# Patient Record
Sex: FEMALE | Race: AMERICAN INDIAN OR ALASKA NATIVE
[De-identification: names, ages, dates, MRNs, and addresses within clinical notes are randomized per-mention and may not be internally consistent; named-entity substitution may affect disease eponyms.]

---

## 2019-07-17 ENCOUNTER — HOSPITAL ENCOUNTER (INPATIENT)
Dept: HOSPITAL 5 - ED | Age: 59
LOS: 18 days | DRG: 208 | End: 2019-08-04
Attending: INTERNAL MEDICINE | Admitting: INTERNAL MEDICINE
Payer: MEDICAID

## 2019-07-17 DIAGNOSIS — C79.51: ICD-10-CM

## 2019-07-17 DIAGNOSIS — M84.48XA: ICD-10-CM

## 2019-07-17 DIAGNOSIS — E87.1: ICD-10-CM

## 2019-07-17 DIAGNOSIS — R00.0: ICD-10-CM

## 2019-07-17 DIAGNOSIS — C50.919: ICD-10-CM

## 2019-07-17 DIAGNOSIS — J96.21: Primary | ICD-10-CM

## 2019-07-17 DIAGNOSIS — R65.10: ICD-10-CM

## 2019-07-17 DIAGNOSIS — G82.20: ICD-10-CM

## 2019-07-17 DIAGNOSIS — G93.41: ICD-10-CM

## 2019-07-17 DIAGNOSIS — Z86.718: ICD-10-CM

## 2019-07-17 DIAGNOSIS — I10: ICD-10-CM

## 2019-07-17 DIAGNOSIS — M84.419A: ICD-10-CM

## 2019-07-17 DIAGNOSIS — D63.8: ICD-10-CM

## 2019-07-17 LAB
ALBUMIN SERPL-MCNC: 3.2 G/DL (ref 3.9–5)
ALT SERPL-CCNC: 23 UNITS/L (ref 7–56)
ANISOCYTOSIS BLD QL SMEAR: (no result)
APTT BLD: 27.2 SEC. (ref 24.2–36.6)
BAND NEUTROPHILS # (MANUAL): 0 K/MM3
BUN SERPL-MCNC: 11 MG/DL (ref 7–17)
BUN/CREAT SERPL: 37 %
CALCIUM SERPL-MCNC: 9.5 MG/DL (ref 8.4–10.2)
HCT VFR BLD CALC: 30.1 % (ref 30.3–42.9)
HEMOLYSIS INDEX: 36
HGB BLD-MCNC: 10.1 GM/DL (ref 10.1–14.3)
INR PPP: 1.22 (ref 0.87–1.13)
MCHC RBC AUTO-ENTMCNC: 34 % (ref 30–34)
MCV RBC AUTO: 95 FL (ref 79–97)
MYELOCYTES # (MANUAL): 0 K/MM3
PLATELET # BLD: 123 K/MM3 (ref 140–440)
PROMYELOCYTES # (MANUAL): 0 K/MM3
RBC # BLD AUTO: 3.18 M/MM3 (ref 3.65–5.03)
TARGETS BLD QL SMEAR: (no result)
TOTAL CELLS COUNTED BLD: 100

## 2019-07-17 PROCEDURE — 36415 COLL VENOUS BLD VENIPUNCTURE: CPT

## 2019-07-17 PROCEDURE — 82553 CREATINE MB FRACTION: CPT

## 2019-07-17 PROCEDURE — 80053 COMPREHEN METABOLIC PANEL: CPT

## 2019-07-17 PROCEDURE — 96365 THER/PROPH/DIAG IV INF INIT: CPT

## 2019-07-17 PROCEDURE — 85025 COMPLETE CBC W/AUTO DIFF WBC: CPT

## 2019-07-17 PROCEDURE — 83880 ASSAY OF NATRIURETIC PEPTIDE: CPT

## 2019-07-17 PROCEDURE — 94002 VENT MGMT INPAT INIT DAY: CPT

## 2019-07-17 PROCEDURE — 80048 BASIC METABOLIC PNL TOTAL CA: CPT

## 2019-07-17 PROCEDURE — 36600 WITHDRAWAL OF ARTERIAL BLOOD: CPT

## 2019-07-17 PROCEDURE — 99285 EMERGENCY DEPT VISIT HI MDM: CPT

## 2019-07-17 PROCEDURE — 92950 HEART/LUNG RESUSCITATION CPR: CPT

## 2019-07-17 PROCEDURE — 85007 BL SMEAR W/DIFF WBC COUNT: CPT

## 2019-07-17 PROCEDURE — 83735 ASSAY OF MAGNESIUM: CPT

## 2019-07-17 PROCEDURE — 82803 BLOOD GASES ANY COMBINATION: CPT

## 2019-07-17 PROCEDURE — 85379 FIBRIN DEGRADATION QUANT: CPT

## 2019-07-17 PROCEDURE — 82962 GLUCOSE BLOOD TEST: CPT

## 2019-07-17 PROCEDURE — 87116 MYCOBACTERIA CULTURE: CPT

## 2019-07-17 PROCEDURE — 85730 THROMBOPLASTIN TIME PARTIAL: CPT

## 2019-07-17 PROCEDURE — 94760 N-INVAS EAR/PLS OXIMETRY 1: CPT

## 2019-07-17 PROCEDURE — 71045 X-RAY EXAM CHEST 1 VIEW: CPT

## 2019-07-17 PROCEDURE — 71275 CT ANGIOGRAPHY CHEST: CPT

## 2019-07-17 PROCEDURE — 93010 ELECTROCARDIOGRAM REPORT: CPT

## 2019-07-17 PROCEDURE — 85610 PROTHROMBIN TIME: CPT

## 2019-07-17 PROCEDURE — 4A033R1 MEASUREMENT OF ARTERIAL SATURATION, PERIPHERAL, PERCUTANEOUS APPROACH: ICD-10-PCS | Performed by: INTERNAL MEDICINE

## 2019-07-17 PROCEDURE — 94660 CPAP INITIATION&MGMT: CPT

## 2019-07-17 PROCEDURE — 93005 ELECTROCARDIOGRAM TRACING: CPT

## 2019-07-17 PROCEDURE — 82550 ASSAY OF CK (CPK): CPT

## 2019-07-17 PROCEDURE — 5A09357 ASSISTANCE WITH RESPIRATORY VENTILATION, LESS THAN 24 CONSECUTIVE HOURS, CONTINUOUS POSITIVE AIRWAY PRESSURE: ICD-10-PCS | Performed by: INTERNAL MEDICINE

## 2019-07-17 PROCEDURE — 84484 ASSAY OF TROPONIN QUANT: CPT

## 2019-07-17 NOTE — XRAY REPORT
CHEST 1 VIEW 5:34 PM



INDICATION / CLINICAL INFORMATION:

Shortness of breath.



COMPARISON: 

None available.



FINDINGS:



SUPPORT DEVICES: None.

HEART / MEDIASTINUM: There is mild cardiomegaly with a left ventricular configuration. Pulmonary vasc
ulature is borderline.

LUNGS / PLEURA: There is dense consolidation in the left lower lobe with obscuration of the hemidiaph
ragm. There may be a small left pleural effusion. No pneumothorax. 

ADDITIONAL FINDINGS: There are bilateral rib fractures which are of uncertain age. There is moderate 
thoracolumbar scoliosis. There is mild mottled lucency in the distal right clavicle with a possible f
racture present. There are also may be a lytic lesion in the distal left clavicle.



IMPRESSION:

1. Left lower lobe pneumonia. 

2. Bilateral rib fractures. Possible fracture of the distal right clavicle could be pathologic. Possi
ble lytic lesion in the distal left clavicle.



Signer Name: Narciso Covington MD 

Signed: 7/17/2019 5:53 PM

 Workstation Name: [id number]

## 2019-07-17 NOTE — HISTORY AND PHYSICAL REPORT
History of Present Illness


Date of examination: 07/17/19


History of present illness: 


59-year-old with a history of hypertension, metastatic breast cancer, because 

emergency room with complaints of chest pain located across the chest that 

started 3 months ago.  mariela was diagnosed with breast cancer in 2014, status 

post lumpectomy, but over the last 3 months her cancer has metastasized, since 

then she has been having shortness of breath, started on home oxygen and chest 

pain secondary to metastases to the ribs and other bones. Also complaining of 

pain into the shoulders which are sharp, intermittent every 5 minutes, intensity

7/10, no radiation, relieved with Percocet at home.  The patient also 

complaining of shortness of breath 2 months, she was placed on home oxygen.  In

the ER patient was placed on BiPAP


She was admitted at [facility] July 6, she had presenting symptoms are 

shortness breath, chest pain.  I have requested and reviewed the records.  She 

had a V/Q which was negative for PE, Doppler was negative for DVT.  Is also 

noted to have spinal cord involvement of the tumor, also had hydronephrosis 

bilaterally with hydroureteral.  The patient was started on palliative radiation

treatment 4 days ago.  She was discharged to hospice from [facility], 

unclear if this was started


Review of systems


Constitutional: no  weight loss, chills, fever


Ears, eyes, nose, mouth and throat: no nasal congestion, no nasal discharge, no 

sinus pressure, no vision change, no red eye.


Neck: No neck pain or rigidity.


Cardiovascular: no palpitations, chest pain


Respiratory: no cough, shortness of breath


Gastrointestinal: no hematochezia, abdominal pain


Genitourinary : no  frequency , no hematuria


Musculoskeletal: no joint swelling or muscle ache 


Integumentary: no rash, no pruritis


Neurological: no parathesias, no focal weakness


Endocrine: no cold or heat intolerance, no polyuria or polydipsia


Hematologic/Lymphatic: no easy bruising, no easy bleeding, no gland swelling


Allergic/Immunologic: no urticaria, no angioedema.





PAST MEDICAL HISTORY:hypertension, metastatic breast cancer





PAST SURGICAL HISTORY: Lumpectomy





SOCIAL HISTORY: Denies alcohol,  drugs, tobacco





FAMILY HISTORY: Hypertension








Medications and Allergies


                                    Allergies











Allergy/AdvReac Type Severity Reaction Status Date / Time


 


No Known Allergies Allergy   Unverified 12/19/13 15:54











                                Home Medications











 Medication  Instructions  Recorded  Confirmed  Last Taken  Type


 


No Known Home Medications [No  07/22/19 07/22/19 Unknown History





Reported Home Medications]     











Active Meds: 


Active Medications





Ketorolac Tromethamine (Toradol)  15 mg IV ONCE ONE


   Stop: 07/17/19 22:16











Exam





- Physical Exam


Narrative exam: 


General Apperance: The patient lying in bed,  breathing comfortable 


HEENT: Normocephalic, atraumatic.  Pupils equally round and reactive to light, 

EOMI, no sclericterus or JVD or thyromegaly or nodule.  , no carotid bruit, 

mucous membranes moist, no exudate or erythema


Heart: S1-S2, regular is rhythm


Lungs: Clear to auscultation bilaterally, breathing comfortable


Abdomen: Positive bowel sounds, soft, nontender, nondistended, no organomegaly


Extremities: No edema cyanosis clubbing


Skin: no rash, nodule, warm and dry


Neuro: cranial nerves 2-12 intact, speech is fluent, moves all four extremities





- Constitutional


Vitals: 


                                        











Temp Pulse Resp BP Pulse Ox


 


 98.4 F   133 H  16   116/78   100 


 


 07/17/19 19:22  07/17/19 20:30  07/17/19 20:30  07/17/19 20:30  07/17/19 20:30














Results





- Labs


CBC & Chem 7: 


                                 07/24/19 04:20





                                 07/24/19 04:20


Labs: 


                              Abnormal lab results











  07/17/19 07/17/19 07/17/19 Range/Units





  18:00 18:00 18:00 


 


RBC  3.18 L    (3.65-5.03)  M/mm3


 


Hct  30.1 L    (30.3-42.9)  %


 


RDW  25.1 H    (13.2-15.2)  %


 


Plt Count  123 L    (140-440)  K/mm3


 


Seg Neuts % (Manual)  76.0 H    (40.0-70.0)  %


 


Lymphocytes % (Manual)  12.0 L    (13.4-35.0)  %


 


Monocytes % (Manual)  10.0 H    (0.0-7.3)  %


 


Nucleated RBC %  15.0 H    (0.0-0.9)  %


 


Lymphocytes # (Manual)  1.0 L    (1.2-5.4)  K/mm3


 


Monocytes # (Manual)  0.9 H    (0.0-0.8)  K/mm3


 


PT   15.1 H   (12.2-14.9)  Sec.


 


INR   1.22 H   (0.87-1.13)  


 


D-Dimer   6342.18 H   (0-234)  ng/mlDDU


 


POC ABG pCO2     (35-45)  


 


POC ABG pO2     ()  


 


Carbon Dioxide    32 H  (22-30)  mmol/L


 


Creatinine    0.3 L  (0.7-1.2)  mg/dL


 


Glucose    106 H  ()  mg/dL


 


AST    85 H  (5-40)  units/L


 


NT-Pro-B Natriuret Pep    1304 H  (0-900)  pg/mL


 


Albumin    3.2 L  (3.9-5)  g/dL














  07/17/19 Range/Units





  20:53 


 


RBC   (3.65-5.03)  M/mm3


 


Hct   (30.3-42.9)  %


 


RDW   (13.2-15.2)  %


 


Plt Count   (140-440)  K/mm3


 


Seg Neuts % (Manual)   (40.0-70.0)  %


 


Lymphocytes % (Manual)   (13.4-35.0)  %


 


Monocytes % (Manual)   (0.0-7.3)  %


 


Nucleated RBC %   (0.0-0.9)  %


 


Lymphocytes # (Manual)   (1.2-5.4)  K/mm3


 


Monocytes # (Manual)   (0.0-0.8)  K/mm3


 


PT   (12.2-14.9)  Sec.


 


INR   (0.87-1.13)  


 


D-Dimer   (0-234)  ng/mlDDU


 


POC ABG pCO2  52.0 H  (35-45)  


 


POC ABG pO2  111 H  ()  


 


Carbon Dioxide   (22-30)  mmol/L


 


Creatinine   (0.7-1.2)  mg/dL


 


Glucose   ()  mg/dL


 


AST   (5-40)  units/L


 


NT-Pro-B Natriuret Pep   (0-900)  pg/mL


 


Albumin   (3.9-5)  g/dL














- Imaging and Cardiology


EKG: image reviewed


Chest x-ray: image reviewed


CT scan - chest: report reviewed





Assessment and Plan


Assessment


Acute respiratory failure


Chest pain secondary to multiple lytic lesions in the ribs


Possible pathological fracture of rib 10


Metastatic breast cancer








Plan


Admit to medicine


Consult  for hospice


Continue BiPAP, DVT rophalaxis

## 2019-07-17 NOTE — EMERGENCY DEPARTMENT REPORT
HPI





- General


Chief Complaint: Dyspnea/Respdistress


Time Seen by Provider: 19 17:18





- HPI


HPI: 





Room 24








The patient is a 59-year-old female presenting with a chief complaint of chest 

pain.  Patient states she has felt weak for a long time.  When asked for how 

long she's felt weak the patient replies "forever."  The patient states she came

to the emergency department this for the past week she's had a constant chest 

pain.  Patient will not describe the quality of pain but states she also feels 

short of breath and dizzy.  Patient denies nausea or vomiting.  The patient has 

a history of metastatic breast CA and states she last received radiation one 

week ago








HPI update


Family at bedside states the patient was recently discharged from [facility] last week and was diagnosed with a lower extremity DVT.  They state the

patient does not have any advanced directives and she is Full Code








Location: [See above]


Duration: [See above]


Quality:  [See above]


Severity:  [See above]


Modifying factors: [see above]


Context: [see above]


Mode of transportation: [not driving]





ED Past Medical Hx





- Past Medical History


Hx of Cancer: Yes (breast CA)





- Family History


Family history: no significant





- Social History


Smoking Status: Never Smoker


Substance Use Type: None





- Medications


Home Medications: 


                                Home Medications











 Medication  Instructions  Recorded  Confirmed  Last Taken  Type


 


No Known Home Medications [No  19 Unknown History





Reported Home Medications]     














ED Review of Systems


ROS: 


Stated complaint: SOB OF BREATH


Other details as noted in HPI





Constitutional: weakness


Eyes: denies: eye pain


ENT: denies: throat pain


Respiratory: shortness of breath


Cardiovascular: chest pain


Endocrine: no symptoms reported


Gastrointestinal: denies: nausea, vomiting


Genitourinary: denies: dysuria


Musculoskeletal: denies: back pain


Neurological: denies: headache





Physical Exam





- Physical Exam


Vital Signs: 


                                   Vital Signs











  19





  17:11 17:14


 


Temperature 98.6 F 98.6 F


 


Pulse Rate 125 H 125 H


 


Respiratory 27 H 27 H





Rate  


 


Blood Pressure 131/83 


 


Blood Pressure  131/83





[Right]  


 


O2 Sat by Pulse 97 97





Oximetry  











Physical Exam: 





GENERAL: The patient is well-developed well-nourished female lying on stretcher 

exhibiting increased work of breathing. []


HEENT: Normocephalic.  Atraumatic.  Extraocular motions are intact.  Patient has

 moist mucous membranes.


NECK: Trachea midline


CHEST/LUNGS: Increased work of breathing.  Accessory muscle use


HEART/CARDIOVASCULAR: Regular.  There is tachycardia.  There is no gallop rub or

 murmur.


ABDOMEN: Abdomen is soft, nontender.  Patient has normal bowel sounds.  There is

 no abdominal distention.


SKIN: There is no rash.  There is no diaphoresis.


NEURO: The patient is awake, alert, and oriented.  The patient is cooperative.  

The patient has normal speech


MUSCULOSKELETAL:  There is no evidence of acute injury.





ED Course


                                   Vital Signs











  19





  17:11 17:14


 


Temperature 98.6 F 98.6 F


 


Pulse Rate 125 H 125 H


 


Respiratory 27 H 27 H





Rate  


 


Blood Pressure 131/83 


 


Blood Pressure  131/83





[Right]  


 


O2 Sat by Pulse 97 97





Oximetry  














ED Medical Decision Making





- Lab Data


Result diagrams: 


                                 19 04:35





                                 19 04:35





- EKG Data


-: EKG Interpreted by Me


EKG shows normal: sinus rhythm


Rate: tachycardia





- EKG Data


When compared to previous EKG there are: previous EKG unavailable


Interpretation: other (no ischemic changes seen)





- Radiology Data


Radiology results: report reviewed (chest x-ray), image reviewed (chest x-ray)


interpreted by me: 





Chest x-ray-left lower lobe haziness.  No pneumothorax





[facility] 


[address] [place] [address] 





XRay Report 


Signed 





Patient: CELINE MCLEOD MR#: [id number] 


3 


: 1960 Acct:[id number] 





Age/Sex: 59 / F ADM Date: 19 





Loc: ED 


Attending Dr: 








Ordering Physician: RAYMOND POWERS MD 


Date of Service: 19 


Procedure(s): XR chest 1V ap 


Accession Number(s): [id number] 





cc: RAYMOND POWERS MD 





Fluoro Time In Minutes: 





CHEST 1 VIEW 5:34 PM 





INDICATION / CLINICAL INFORMATION: 


Shortness of breath. 





COMPARISON: 


None available. 





FINDINGS: 





SUPPORT DEVICES: None. 


HEART / MEDIASTINUM: There is mild cardiomegaly with a left ventricular 

configuration. Pulmonary 


vasculature is borderline. 


LUNGS / PLEURA: There is dense consolidation in the left lower lobe with 

obscuration of the 


hemidiaphragm. There may be a small left pleural effusion. No pneumothorax. 


ADDITIONAL FINDINGS: There are bilateral rib fractures which are of uncertain 

age. There is 


moderate thoracolumbar scoliosis. There is mild mottled lucency in the distal 

right clavicle with a 


possible fracture present. There are also may be a lytic lesion in the distal 

left clavicle. 





IMPRESSION: 


1. Left lower lobe pneumonia. 


2. Bilateral rib fractures. Possible fracture of the distal right clavicle could

 be pathologic. 


Possible lytic lesion in the distal left clavicle. 





Signer Name: Narciso Covington MD 


Signed: 2019 5:53 PM 


Workstation Name: [id number] 








Transcribed By: RT 


Dictated By: Narciso Covington MD 


Electronically Authenticated By: Narciso Covington MD 


Signed Date/Time: 19 











DD/DT: 19 


TD/TT:








[facility] [address] [id number] Cat

 Scan Report Signed Patient: CELINE MCLEOD MR#: [id number] 3 : 1960 

Acct:[id number] Age/Sex: 59 / F ADM Date: 19 Loc: ED Attending Dr: 

Ordering Physician: RAYMOND POWERS MD Date of Service: 19 Procedure(s): CT 

angio chest Accession Number(s): [id number] cc: RAYMOND POWERS MD CTA CHEST WITH IV 

CONTRAST INDICATION: shortness of breath. Metastatic breast CA. TECHNIQUE: Axial

 CT images were obtained through the chest after injection of 100 cc Omni 350 IV

 contrast. 3 plane MIP reconstructions were produced. All CT scans at this 

location are performed using CT dose reduction for ALARA by means of automated 

exposure control. COMPARISON: None available. FINDINGS: PULMONARY ARTERIES: No 

pulmonary emboli. THORACIC AORTA: No acute abnormality. HEART: Heart is 

moderately enlarged CORONARY ARTERIES: No significant calcification. PLEURA: 

Small bilateral pleural effusions No pneumothorax. LYMPH NODES: No significant 

adenopathy. LUNGS: Compressive and linear bilateral atelectasis. ADDITIONAL 

FINDINGS: None. UPPER ABDOMEN: No acute findings. SKELETAL STRUCTURES: Extensive

 lytic metastases are seen throughout the rib cage bilaterally and along the th

oracic vertebral bodies. There is an acute displaced fracture involving the left

 10th rib. Multiple old healed bilateral rib fracture deformities are noted. 

IMPRESSION: 1. No CT evidence for pulmonary embolism. 2. Extensive lytic 

skeletal metastases with acute probable pathologic fracture involving the left 

10th posterior rib. 3. Cardiomegaly with small bilateral pleural effusions. 

Signer Name: Jay Vargas MD Signed: 2019 10:08 PM Workstation Name: 

[id number] Transcribed By: TL Dictated By: Jay Vargas MD 

Electronically Authenticated By: Jay Vargas MD Signed Date/Time: 

19 DD/DT: 19 TD/TT: 





- Differential Diagnosis


ACS, PE, pneumonia, sepsis


Critical care attestation.: 


If time is entered above; I have spent that time in minutes in the direct care 

of this critically ill patient, excluding procedure time.








ED Disposition


Clinical Impression: 


 Chest pain, Rib fractures, Pathologic fracture of clavicle





Disposition:  OP ADMIT IP TO THIS HOSP


Is pt being admited?: Yes


Does the pt Need Aspirin: Yes


Condition: Fair

## 2019-07-17 NOTE — CAT SCAN REPORT
CTA CHEST WITH IV CONTRAST



INDICATION:

shortness of breath.  Metastatic breast CA.



TECHNIQUE:

Axial CT images were obtained through the chest after injection of 100 cc Omni 350 IV contrast. 3 baron
ne MIP reconstructions were produced. All CT scans at this location are performed using CT dose reduc
tion for ALARA by means of automated exposure control. 



COMPARISON:

None available.



FINDINGS:

PULMONARY ARTERIES: No pulmonary emboli.

THORACIC AORTA: No acute abnormality.

HEART: Heart is moderately enlarged

CORONARY ARTERIES: No significant calcification.

PLEURA: Small bilateral pleural effusions No pneumothorax.

LYMPH NODES: No significant adenopathy.

LUNGS: Compressive and linear bilateral atelectasis. 



ADDITIONAL FINDINGS: None.



UPPER ABDOMEN: No acute findings.



SKELETAL STRUCTURES: Extensive lytic metastases are seen throughout the rib cage bilaterally and emmett
g the thoracic vertebral bodies. There is an acute displaced fracture involving the left 10th rib. Mu
ltiple old healed bilateral rib fracture deformities are noted.



IMPRESSION:

1. No CT evidence for pulmonary embolism. 

2. Extensive lytic skeletal metastases with acute probable pathologic fracture involving the left 10t
h posterior rib.

3. Cardiomegaly with small bilateral pleural effusions.



Signer Name: Jay Vargas MD 

Signed: 7/17/2019 10:08 PM

 Workstation Name: [id number]

## 2019-07-18 LAB
%HYPO/RBC NFR BLD AUTO: (no result) %
ANISOCYTOSIS BLD QL SMEAR: (no result)
BAND NEUTROPHILS # (MANUAL): 0 K/MM3
BUN SERPL-MCNC: 11 MG/DL (ref 7–17)
BUN/CREAT SERPL: 37 %
CALCIUM SERPL-MCNC: 9 MG/DL (ref 8.4–10.2)
HCT VFR BLD CALC: 28 % (ref 30.3–42.9)
HEMOLYSIS INDEX: 4
HGB BLD-MCNC: 9.5 GM/DL (ref 10.1–14.3)
MACROCYTES BLD QL SMEAR: (no result)
MCHC RBC AUTO-ENTMCNC: 34 % (ref 30–34)
MCV RBC AUTO: 94 FL (ref 79–97)
MYELOCYTES # (MANUAL): 0 K/MM3
PLATELET # BLD: 105 K/MM3 (ref 140–440)
PROMYELOCYTES # (MANUAL): 0 K/MM3
RBC # BLD AUTO: 2.97 M/MM3 (ref 3.65–5.03)
TOTAL CELLS COUNTED BLD: 100

## 2019-07-18 PROCEDURE — 5A09357 ASSISTANCE WITH RESPIRATORY VENTILATION, LESS THAN 24 CONSECUTIVE HOURS, CONTINUOUS POSITIVE AIRWAY PRESSURE: ICD-10-PCS | Performed by: INTERNAL MEDICINE

## 2019-07-18 RX ADMIN — Medication SCH ML: at 14:16

## 2019-07-18 RX ADMIN — METOPROLOL TARTRATE PRN MG: 5 INJECTION INTRAVENOUS at 17:43

## 2019-07-18 RX ADMIN — DEXTROSE AND SODIUM CHLORIDE SCH MLS/HR: 5; .45 INJECTION, SOLUTION INTRAVENOUS at 14:16

## 2019-07-18 RX ADMIN — Medication SCH ML: at 21:12

## 2019-07-18 RX ADMIN — HYDROMORPHONE HYDROCHLORIDE PRN MG: 1 INJECTION, SOLUTION INTRAMUSCULAR; INTRAVENOUS; SUBCUTANEOUS at 16:59

## 2019-07-18 NOTE — EVENT NOTE
Date: 07/18/19





Long discussion at bedside, over 30 minutes discussing Pulmonary status and 

Intubation.  Daughter, Brother and Sister at bedside.  They claim to understand 

the current disease state but feel that the patient should be full code and 

resuscitated.  RT has attempted several times to wean from bipap, each time she 

has failed.  Per report and the daughter at the bedside, she was admitted to 

[facility] with the same diagnosis and given the same prognosis.  Per the daughter 

she was "doped up" and sent home with hospice.  They have since rescinded and a

re here now.  I explained to them that intubation would not fix the underlying 

problem of Stage IV malignancy of the breast.  I also explained to them that 

doing CPR (chest compressions and shocks) are going to cause more harm than good

for the patient as they too do not fix the underlying problem as listed above.  

I never once told the family that they had to make a choice between breathing 

and feeding.  what I explained to them that feeding is contraindicated on a 

patient with continuous bipap therapy as they are at a higher risk of 

aspiration.  The family began to ask me about other option for feeding and I had

to stop because that is not why I was consulted.  The family requests that she 

remain a full code and this is documented.  At this time, she is on 12/6 and 

35%.  She will likely progress, the time frame I do not know.  When she requires

intubation, will intubate and then transfer to unit for further care.  Daughter 

was very upset when I left and I apologized if I made her upset, but my job is 

to explain the current clinical situation to the best of my abilities to help 

the family understand the severity.  Please call if questions.

## 2019-07-18 NOTE — PROGRESS NOTE
Assessment and Plan


Assessment and plan: 





59-year-old [place] female with medical history significant for stage IV 

breast cancer, metastasis to the bone and to the spinal cord with spinal 

compression is interested to the emergency department with complaints of 

shortness of breath.  CTA was done in the emergency department and no PE was 

shown.  Patient was recently discharged from [facility] to hospice care.  I 

get the record from [facility] and she was admitted with the same problems. 

patient is full code and family wants everything to be done and don't want 

hospice care instead they want HH at discharge





Acute on chronic hypoxic respiratory failure


- Patient was on home oxygen


- Continue BiPAP


- Pulmonary consult appreciated


- If patient deteriorates will intubate her


Persistent tachycardia


- Continue on Lopressor IV when necessary


Stage IV breast cancer, with metastatsis to the bone and spine


- Supportive care


- Was treated with palliative intent therapy at [place]


Nutrition


- patient is on D5 half normal saline


- Patient refused NG tube feeding


- We can now take off the BiPAP because patient is desaturating


CODE STATUS full


Disposition; continue inpatient care.  Patient is high risk of deterioration.





The high probability of a clinically significant, sudden or life threatening 

deterioration of the [respiratory, CVS] system(s) required my full and direct 

attention, intervention and personal management. The aggregate critical care 

time was [45] minutes. This time is in addition to time spent performing report

ed procedures but includes the following: 





[x] Data Review and interpretation 





[x] Patient assessment and monitoring of vital signs 





[x] Documentation 





[x] Medication orders and management





History


Interval history: 





Patient was seen and evaluated this morning, patient is in no respiratory 

distress.  She is on BiPAP.





Hospitalist Physical





- Physical exam


Narrative exam: 





 patient is in respiratory distress. 


 The patient appeared well nourished and normally developed.


 Vital signs as documented.


 Head exam is unremarkable.


 No scleral icterus .


 Neck is without jugular venous distension, thyromegaly, or carotid bruits. 


 Lungs are clear to auscultation.


Cardiac exam reveals tachycardia. 


Abdominal exam reveals normal bowel sounds, no masses, no organomegaly and no 

aortic enlargement. 


Extremities are nonedematous and both femoral and pedal pulses are normal.


CNS: Alert and oriented 3.  No focal weakness.





- Constitutional


Vitals: 


                                        











Temp Pulse Resp BP Pulse Ox


 


 97.9 F   133 H  31 H  121/73   98 


 


 07/18/19 12:39  07/18/19 15:36  07/18/19 15:36  07/18/19 12:39  07/18/19 15:36














Results





- Labs


CBC & Chem 7: 


                                 07/18/19 03:54





                                 07/18/19 03:54


Labs: 


                             Laboratory Last Values











WBC  5.6 K/mm3 (4.5-11.0)   07/18/19  03:54    


 


RBC  2.97 M/mm3 (3.65-5.03)  L  07/18/19  03:54    


 


Hgb  9.5 gm/dl (10.1-14.3)  L  07/18/19  03:54    


 


Hct  28.0 % (30.3-42.9)  L  07/18/19  03:54    


 


MCV  94 fl (79-97)   07/18/19  03:54    


 


MCH  32 pg (28-32)   07/18/19  03:54    


 


MCHC  34 % (30-34)   07/18/19  03:54    


 


RDW  24.8 % (13.2-15.2)  H  07/18/19  03:54    


 


Plt Count  105 K/mm3 (140-440)  L  07/18/19  03:54    


 


Mono % (Auto)  Np   07/18/19  03:54    


 


Add Manual Diff  Complete   07/18/19  03:54    


 


Total Counted  100   07/18/19  03:54    


 


Seg Neuts % (Manual)  79.0 % (40.0-70.0)  H  07/18/19  03:54    


 


  3.0 %  07/18/19  03:54    


 


  5.0 % (13.4-35.0)  L  07/18/19  03:54    


 


Reactive Lymphs % (Man)  0 %  07/18/19  03:54    


 


  12.0 % (0.0-7.3)  H  07/18/19  03:54    


 


  0 % (0.0-4.3)   07/18/19  03:54    


 


  0 % (0.0-1.8)   07/18/19  03:54    


 


  0 %  07/18/19  03:54    


 


  1.0 %  07/18/19  03:54    


 


  0 %  07/18/19  03:54    


 


  0 %  07/18/19  03:54    


 


Nucleated RBC %  31.0 % (0.0-0.9)  H  07/18/19  03:54    


 


Seg Neutrophils # Man  0.0 K/mm3 (1.8-7.7)  L  07/18/19  03:54    


 


Band Neutrophils #  0.0 K/mm3  07/18/19  03:54    


 


  0.0 K/mm3 (1.2-5.4)  L  07/18/19  03:54    


 


Abs React Lymphs (Man)  0.0 K/mm3  07/18/19  03:54    


 


  0.0 K/mm3 (0.0-0.8)   07/18/19  03:54    


 


  0.0 K/mm3 (0.0-0.4)   07/18/19  03:54    


 


  0.0 K/mm3 (0.0-0.1)   07/18/19  03:54    


 


  0.0 K/mm3  07/18/19  03:54    


 


  0.0 K/mm3  07/18/19  03:54    


 


  0.0 K/mm3  07/18/19  03:54    


 


Blast Cells #  0.0 K/mm3  07/18/19  03:54    


 


WBC Morphology  Not Reportable   07/18/19  03:54    


 


Hypersegmented Neuts  Not Reportable   07/18/19  03:54    


 


Hyposegmented Neuts  Not Reportable   07/18/19  03:54    


 


Hypogranular Neuts  Not Reportable   07/18/19  03:54    


 


  Not Reportable   07/18/19  03:54    


 


  Not Reportable   07/18/19  03:54    


 


  Not Reportable   07/18/19  03:54    


 


  Not Reportable   07/18/19  03:54    


 


  Not Reportable   07/18/19  03:54    


 


  Not Reportable   07/18/19  03:54    


 


  Consistent w auto   07/18/19  03:54    


 


  Not Reportable   07/18/19  03:54    


 


Plt Clumps, EDTA  Not Reportable   07/18/19  03:54    


 


  Not Reportable   07/18/19  03:54    


 


  Not Reportable   07/18/19  03:54    


 


  Not Reportable   07/18/19  03:54    


 


Plt Morphology Comment  Not Reportable   07/18/19  03:54    


 


RBC Morphology  Not Reportable   07/18/19  03:54    


 


Dimorphic RBCs  Not Reportable   07/18/19  03:54    


 


  Few   07/18/19  03:54    


 


  1+   07/18/19  03:54    


 


  Not Reportable   07/18/19  03:54    


 


  2+   07/18/19  03:54    


 


  Not Reportable   07/18/19  03:54    


 


  1+   07/18/19  03:54    


 


  Not Reportable   07/18/19  03:54    


 


  Not Reportable   07/18/19  03:54    


 


  Not Reportable   07/18/19  03:54    


 


  Not Reportable   07/18/19  03:54    


 


  Not Reportable   07/18/19  03:54    


 


  Not Reportable   07/18/19  03:54    


 


  Not Reportable   07/18/19  03:54    


 


  Not Reportable   07/18/19  03:54    


 


  Not Reportable   07/18/19  03:54    


 


  Not Reportable   07/18/19  03:54    


 


  Not Reportable   07/18/19  03:54    


 


  Not Reportable   07/18/19  03:54    


 


  Not Reportable   07/18/19  03:54    


 


Acanthocytes (Spur)  Not Reportable   07/18/19  03:54    


 


Rouleaux  Not Reportable   07/18/19  03:54    


 


  Not Reportable   07/18/19  03:54    


 


  Not Reportable   07/18/19  03:54    


 


  Not Reportable   07/18/19  03:54    


 


  Not Reportable   07/18/19  03:54    


 


Hem Pathologist Commnt  No   07/18/19  03:54    


 


PT  15.1 Sec. (12.2-14.9)  H  07/17/19  18:00    


 


INR  1.22  (0.87-1.13)  H  07/17/19  18:00    


 


APTT  27.2 Sec. (24.2-36.6)   07/17/19  18:00    


 


  6342.18 ng/mlDDU (0-234)  H  07/17/19  18:00    


 


POC ABG pH  7.449  (7.35-7.45)   07/18/19  09:49    


 


POC ABG pCO2  39.9  (35-45)   07/18/19  09:49    


 


POC ABG pO2  72  ()  L  07/18/19  09:49    


 


POC ABG HCO3  27.6  (22-26 mml/L)   07/18/19  09:49    


 


POC ABG Total CO2  29  (23-27mmol/L)   07/18/19  09:49    


 


POC ABG O2 Sat  95   07/18/19  09:49    


 


POC ABG Base Excess  4  ((-2) - (+3)mmol/L)   07/18/19  09:49    


 


  35 %  07/18/19  09:49    


 


Sodium  145 mmol/L (137-145)   07/18/19  03:54    


 


Potassium  3.9 mmol/L (3.6-5.0)   07/18/19  03:54    


 


Chloride  105.5 mmol/L ()   07/18/19  03:54    


 


Carbon Dioxide  29 mmol/L (22-30)   07/18/19  03:54    


 


  14 mmol/L  07/18/19  03:54    


 


BUN  11 mg/dL (7-17)   07/18/19  03:54    


 


  0.3 mg/dL (0.7-1.2)  L  07/18/19  03:54    


 


Estimated GFR  > 60 ml/min  07/18/19  03:54    


 


  37 %  07/18/19  03:54    


 


Glucose  103 mg/dL ()  H  07/18/19  03:54    


 


Calcium  9.0 mg/dL (8.4-10.2)   07/18/19  03:54    


 


  0.80 mg/dL (0.1-1.2)   07/17/19  18:00    


 


AST  85 units/L (5-40)  H  07/17/19  18:00    


 


ALT  23 units/L (7-56)   07/17/19  18:00    


 


  99 units/L ()   07/17/19  18:00    


 


  132 units/L ()   07/17/19  18:00    


 


CK-MB (CK-2)  3.8 ng/mL (0.0-4.0)   07/17/19  18:00    


 


CK-MB (CK-2) Rel Index  2.8  (0-4)   07/17/19  18:00    


 


  < 0.010 ng/mL (0.00-0.029)   07/17/19  18:00    


 


NT-Pro-B Natriuret Pep  1304 pg/mL (0-900)  H  07/17/19  18:00    


 


  6.6 g/dL (6.3-8.2)   07/17/19  18:00    


 


  3.2 g/dL (3.9-5)  L  07/17/19  18:00    


 


  0.9 %  07/17/19  18:00    














Active Medications





- Current Medications


Current Medications: 














Generic Name Dose Route Start Last Admin





  Trade Name Freq  PRN Reason Stop Dose Admin


 


Acetaminophen  650 mg  07/17/19 23:33 





  Tylenol  PO  





  Q4H PRN  





  Pain MILD(1-3)/Fever >100.5/HA  


 


Acetaminophen/Hydrocodone Bitart  2 each  07/17/19 23:33 





  [place] 5/325  PO  





  Q6H PRN  





  Pain, Moderate (4-6)  


 


Hydromorphone HCl  0.5 mg  07/18/19 09:04 





  Dilaudid  IV  





  Q3H PRN  





  Pain , Severe (7-10)  


 


Dextrose/Sodium Chloride  1,000 mls @ 75 mls/hr  07/18/19 14:00  07/18/19 14:16





  D5/0.45ns  IV   75 mls/hr





  AS DIRECT RAMÓN   Administration


 


Metoprolol Tartrate  2.5 mg  07/18/19 16:33 





  Lopressor  IV  





  Q6HR PRN  





  tachycardia  


 


Ondansetron HCl  4 mg  07/17/19 23:33 





  Zofran  IV  





  Q8H PRN  





  Nausea And Vomiting  


 


Sodium Chloride  10 ml  07/18/19 10:00  07/18/19 14:16





  Sodium Chloride Flush Syringe 10 Ml  IV   10 ml





  BID RAMÓN   Administration


 


Sodium Chloride  10 ml  07/17/19 23:33 





  Sodium Chloride Flush Syringe 10 Ml  IV  





  PRN PRN  





  LINE FLUSH  














Nutrition/Malnutrition Assess





- Dietary Evaluation


Nutrition/Malnutrition Findings: 


                                        





Nutrition Notes                                            Start:  07/18/19 

15:14


Freq:                                                      Status: Active       

 


Protocol:                                                                       

 


 Document     07/18/19 15:14  RM  (Rec: 07/18/19 15:15  RM  [id number])


 Nutrition Notes


     Need for Assessment generated from:         MST


     Initial or Follow up                        Brief Note


     Subjective/Other Information                Screened for malnutrition.


                                                 Pt w/MD at time of visit.


 Nutrition Intervention


     Follow-Up By:                               07/19/19


     Additional Comments                         Follow for malnutrition


                                                 assessment

## 2019-07-19 LAB
ANISOCYTOSIS BLD QL SMEAR: (no result)
BAND NEUTROPHILS # (MANUAL): 0 K/MM3
BUN SERPL-MCNC: 13 MG/DL (ref 7–17)
BUN/CREAT SERPL: 43 %
CALCIUM SERPL-MCNC: 8.7 MG/DL (ref 8.4–10.2)
HCT VFR BLD CALC: 26.5 % (ref 30.3–42.9)
HEMOLYSIS INDEX: 83
HGB BLD-MCNC: 8.7 GM/DL (ref 10.1–14.3)
MCHC RBC AUTO-ENTMCNC: 33 % (ref 30–34)
MCV RBC AUTO: 95 FL (ref 79–97)
MYELOCYTES # (MANUAL): 0 K/MM3
PLATELET # BLD: 108 K/MM3 (ref 140–440)
POIKILOCYTOSIS BLD QL SMEAR: (no result)
PROMYELOCYTES # (MANUAL): 0 K/MM3
RBC # BLD AUTO: 2.8 M/MM3 (ref 3.65–5.03)
TARGETS BLD QL SMEAR: (no result)
TOTAL CELLS COUNTED BLD: 100

## 2019-07-19 PROCEDURE — 5A09357 ASSISTANCE WITH RESPIRATORY VENTILATION, LESS THAN 24 CONSECUTIVE HOURS, CONTINUOUS POSITIVE AIRWAY PRESSURE: ICD-10-PCS | Performed by: INTERNAL MEDICINE

## 2019-07-19 RX ADMIN — Medication SCH ML: at 17:53

## 2019-07-19 RX ADMIN — DEXTROSE AND SODIUM CHLORIDE SCH MLS/HR: 5; .45 INJECTION, SOLUTION INTRAVENOUS at 02:26

## 2019-07-19 RX ADMIN — METOPROLOL TARTRATE PRN MG: 5 INJECTION INTRAVENOUS at 05:14

## 2019-07-19 RX ADMIN — DEXTROSE AND SODIUM CHLORIDE SCH MLS/HR: 5; .45 INJECTION, SOLUTION INTRAVENOUS at 17:53

## 2019-07-19 RX ADMIN — HYDROMORPHONE HYDROCHLORIDE PRN MG: 1 INJECTION, SOLUTION INTRAMUSCULAR; INTRAVENOUS; SUBCUTANEOUS at 06:10

## 2019-07-19 NOTE — PROGRESS NOTE
Assessment and Plan


Assessment and plan: 





59-year-old [place] female with medical history significant for stage IV 

breast cancer, metastasis to the bone and to the spinal cord with spinal 

compression is interested to the emergency department with complaints of 

shortness of breath.  CTA was done in the emergency department and no PE was 

shown.  Patient was recently discharged from [facility] to hospice care.  I 

get the record from [facility] and she was admitted with the same problems. 

patient is full code and family wants everything to be done and don't want 

hospice care instead they want HH at discharge





Acute on chronic hypoxic respiratory failure


- Patient was on home oxygen


- Continue BiPAP


- Pulmonary consult appreciated


- If patient deteriorates will intubate her


Persistent tachycardia


- Continue on Lopressor IV when necessary


Stage IV breast cancer, with metastatsis to the bone and spine, paraplegia


- Supportive care


- Was treated with palliative intent therapy at [place]


Nutrition


- patient is on D5 half normal saline


- Patient refused NG tube feeding


- We can now take off the BiPAP because patient is desaturating


CODE STATUS full


Disposition; continue inpatient care. 








History


Interval history: 





Patient was seen and evaluated this morning, patient is in no respiratory 

distress.  She is on BiPAP.





Hospitalist Physical





- Physical exam


Narrative exam: 





 patient is in respiratory distress. 


 The patient appeared well nourished and normally developed.


 Vital signs as documented.


 Head exam is unremarkable.


 No scleral icterus .


 Neck is without jugular venous distension, thyromegaly, or carotid bruits. 


 Lungs are clear to auscultation.


Cardiac exam reveals tachycardia. 


Abdominal exam reveals normal bowel sounds, no masses, no organomegaly and no 

aortic enlargement. 


Extremities are nonedematous and both femoral and pedal pulses are normal.


CNS:paraplegic.





- Constitutional


Vitals: 


                                        











Temp Pulse Resp BP Pulse Ox


 


 97.3 F L  117 H  19   114/76   98 


 


 07/18/19 23:54  07/19/19 07:03  07/19/19 07:03  07/18/19 23:54  07/19/19 07:03














Results





- Labs


CBC & Chem 7: 


                                 07/19/19 04:43





                                 07/19/19 04:43


Labs: 


                             Laboratory Last Values











WBC  6.5 K/mm3 (4.5-11.0)   07/19/19  04:43    


 


RBC  2.80 M/mm3 (3.65-5.03)  L  07/19/19  04:43    


 


Hgb  8.7 gm/dl (10.1-14.3)  L  07/19/19  04:43    


 


Hct  26.5 % (30.3-42.9)  L  07/19/19  04:43    


 


MCV  95 fl (79-97)   07/19/19  04:43    


 


MCH  31 pg (28-32)   07/19/19  04:43    


 


MCHC  33 % (30-34)   07/19/19  04:43    


 


RDW  24.5 % (13.2-15.2)  H  07/19/19  04:43    


 


Plt Count  108 K/mm3 (140-440)  L  07/19/19  04:43    


 


Lymph % (Auto)  Np   07/19/19  04:43    


 


Mono % (Auto)  Np   07/19/19  04:43    


 


Eos % (Auto)  Np   07/19/19  04:43    


 


Baso % (Auto)  Np   07/19/19  04:43    


 


Lymph #  Np   07/19/19  04:43    


 


Mono #  Np   07/19/19  04:43    


 


Eos #  Np   07/19/19  04:43    


 


Baso #  Np   07/19/19  04:43    


 


Add Manual Diff  Complete   07/19/19  04:43    


 


Total Counted  100   07/19/19  04:43    


 


Seg Neutrophils %  Np   07/19/19  04:43    


 


Seg Neuts % (Manual)  70.0 % (40.0-70.0)   07/19/19  04:43    


 


  2.0 %  07/19/19  04:43    


 


  8.0 % (13.4-35.0)  L  07/19/19  04:43    


 


Reactive Lymphs % (Man)  0 %  07/19/19  04:43    


 


  16.0 % (0.0-7.3)  H  07/19/19  04:43    


 


  1.0 % (0.0-4.3)   07/19/19  04:43    


 


  0 % (0.0-1.8)   07/19/19  04:43    


 


  3.0 %  07/19/19  04:43    


 


  0 %  07/19/19  04:43    


 


  0 %  07/19/19  04:43    


 


  0 %  07/19/19  04:43    


 


Nucleated RBC %  43.0 % (0.0-0.9)  H  07/19/19  04:43    


 


Seg Neutrophils #  Np   07/19/19  04:43    


 


Seg Neutrophils # Man  0.0 K/mm3 (1.8-7.7)  L  07/19/19  04:43    


 


Band Neutrophils #  0.0 K/mm3  07/19/19  04:43    


 


  0.0 K/mm3 (1.2-5.4)  L  07/19/19  04:43    


 


Abs React Lymphs (Man)  0.0 K/mm3  07/19/19  04:43    


 


  0.0 K/mm3 (0.0-0.8)   07/19/19  04:43    


 


  0.0 K/mm3 (0.0-0.4)   07/19/19  04:43    


 


  0.0 K/mm3 (0.0-0.1)   07/19/19  04:43    


 


  0.0 K/mm3  07/19/19  04:43    


 


  0.0 K/mm3  07/19/19  04:43    


 


  0.0 K/mm3  07/19/19  04:43    


 


Blast Cells #  0.0 K/mm3  07/19/19  04:43    


 


WBC Morphology  Not Reportable   07/19/19  04:43    


 


Hypersegmented Neuts  Not Reportable   07/19/19  04:43    


 


Hyposegmented Neuts  Not Reportable   07/19/19  04:43    


 


Hypogranular Neuts  Not Reportable   07/19/19  04:43    


 


  Not Reportable   07/19/19  04:43    


 


  Not Reportable   07/19/19  04:43    


 


  Not Reportable   07/19/19  04:43    


 


  Not Reportable   07/19/19  04:43    


 


  Not Reportable   07/19/19  04:43    


 


  Not Reportable   07/19/19  04:43    


 


  Consistent w auto   07/19/19  04:43    


 


  Not Reportable   07/19/19  04:43    


 


Plt Clumps, EDTA  Not Reportable   07/19/19  04:43    


 


  Not Reportable   07/19/19  04:43    


 


  Not Reportable   07/19/19  04:43    


 


  Not Reportable   07/19/19  04:43    


 


Plt Morphology Comment  Not Reportable   07/19/19  04:43    


 


RBC Morphology  Not Reportable   07/19/19  04:43    


 


Dimorphic RBCs  Not Reportable   07/19/19  04:43    


 


  Few   07/19/19  04:43    


 


  Not Reportable   07/19/19  04:43    


 


  1+   07/19/19  04:43    


 


  2+   07/19/19  04:43    


 


  Not Reportable   07/19/19  04:43    


 


  Not Reportable   07/19/19  04:43    


 


  Not Reportable   07/19/19  04:43    


 


  Not Reportable   07/19/19  04:43    


 


  Not Reportable   07/19/19  04:43    


 


  1+   07/19/19  04:43    


 


  Not Reportable   07/19/19  04:43    


 


  Not Reportable   07/19/19  04:43    


 


  Not Reportable   07/19/19  04:43    


 


  Not Reportable   07/19/19  04:43    


 


  Not Reportable   07/19/19  04:43    


 


  Not Reportable   07/19/19  04:43    


 


  Not Reportable   07/19/19  04:43    


 


  Not Reportable   07/19/19  04:43    


 


  Not Reportable   07/19/19  04:43    


 


Acanthocytes (Spur)  Not Reportable   07/19/19  04:43    


 


Rouleaux  Not Reportable   07/19/19  04:43    


 


  Not Reportable   07/19/19  04:43    


 


  Rare   07/19/19  04:43    


 


  Not Reportable   07/19/19  04:43    


 


  Not Reportable   07/19/19  04:43    


 


Hem Pathologist Commnt  No   07/19/19  04:43    


 


PT  15.1 Sec. (12.2-14.9)  H  07/17/19  18:00    


 


INR  1.22  (0.87-1.13)  H  07/17/19  18:00    


 


APTT  27.2 Sec. (24.2-36.6)   07/17/19  18:00    


 


  6342.18 ng/mlDDU (0-234)  H  07/17/19  18:00    


 


POC ABG pH  7.449  (7.35-7.45)   07/18/19  09:49    


 


POC ABG pCO2  39.9  (35-45)   07/18/19  09:49    


 


POC ABG pO2  72  ()  L  07/18/19  09:49    


 


POC ABG HCO3  27.6  (22-26 mml/L)   07/18/19  09:49    


 


POC ABG Total CO2  29  (23-27mmol/L)   07/18/19  09:49    


 


POC ABG O2 Sat  95   07/18/19  09:49    


 


POC ABG Base Excess  4  ((-2) - (+3)mmol/L)   07/18/19  09:49    


 


  35 %  07/18/19  09:49    


 


Sodium  145 mmol/L (137-145)   07/19/19  04:43    


 


Potassium  4.4 mmol/L (3.6-5.0)   07/19/19  04:43    


 


Chloride  108.1 mmol/L ()  H  07/19/19  04:43    


 


Carbon Dioxide  26 mmol/L (22-30)   07/19/19  04:43    


 


  15 mmol/L  07/19/19  04:43    


 


BUN  13 mg/dL (7-17)   07/19/19  04:43    


 


  0.3 mg/dL (0.7-1.2)  L  07/19/19  04:43    


 


Estimated GFR  > 60 ml/min  07/19/19  04:43    


 


  43 %  07/19/19  04:43    


 


Glucose  145 mg/dL ()  H  07/19/19  04:43    


 


Calcium  8.7 mg/dL (8.4-10.2)   07/19/19  04:43    


 


  0.80 mg/dL (0.1-1.2)   07/17/19  18:00    


 


AST  85 units/L (5-40)  H  07/17/19  18:00    


 


ALT  23 units/L (7-56)   07/17/19  18:00    


 


  99 units/L ()   07/17/19  18:00    


 


  132 units/L ()   07/17/19  18:00    


 


CK-MB (CK-2)  3.8 ng/mL (0.0-4.0)   07/17/19  18:00    


 


CK-MB (CK-2) Rel Index  2.8  (0-4)   07/17/19  18:00    


 


  < 0.010 ng/mL (0.00-0.029)   07/17/19  18:00    


 


NT-Pro-B Natriuret Pep  1304 pg/mL (0-900)  H  07/17/19  18:00    


 


  6.6 g/dL (6.3-8.2)   07/17/19  18:00    


 


  3.2 g/dL (3.9-5)  L  07/17/19  18:00    


 


  0.9 %  07/17/19  18:00    














Active Medications





- Current Medications


Current Medications: 














Generic Name Dose Route Start Last Admin





  Trade Name Freq  PRN Reason Stop Dose Admin


 


Acetaminophen  650 mg  07/17/19 23:33 





  Tylenol  PO  





  Q4H PRN  





  Pain MILD(1-3)/Fever >100.5/HA  


 


Acetaminophen/Hydrocodone Bitart  2 each  07/17/19 23:33 





  [place] 5/325  PO  





  Q6H PRN  





  Pain, Moderate (4-6)  


 


Hydromorphone HCl  0.5 mg  07/18/19 09:04  07/19/19 06:10





  Dilaudid  IV   0.5 mg





  Q3H PRN   Administration





  Pain , Severe (7-10)  


 


Dextrose/Sodium Chloride  1,000 mls @ 75 mls/hr  07/18/19 14:00  07/19/19 02:26





  D5/0.45ns  IV   75 mls/hr





  AS DIRECT RAMÓN   Administration


 


Metoprolol Tartrate  2.5 mg  07/18/19 16:33  07/19/19 05:14





  Lopressor  IV   2.5 mg





  Q6HR PRN   Administration





  tachycardia  


 


Ondansetron HCl  4 mg  07/17/19 23:33 





  Zofran  IV  





  Q8H PRN  





  Nausea And Vomiting  


 


Sodium Chloride  10 ml  07/18/19 10:00  07/18/19 21:12





  Sodium Chloride Flush Syringe 10 Ml  IV   10 ml





  BID RAMÓN   Administration


 


Sodium Chloride  10 ml  07/17/19 23:33 





  Sodium Chloride Flush Syringe 10 Ml  IV  





  PRN PRN  





  LINE FLUSH  














Nutrition/Malnutrition Assess





- Dietary Evaluation


Nutrition/Malnutrition Findings: 


                                        





Nutrition Notes                                            Start:  07/18/19 

15:14


Freq:                                                      Status: Active       




Protocol:                                                                       




 Document     07/18/19 15:14  RM  (Rec: 07/18/19 15:15    [id number])


 Nutrition Notes


     Need for Assessment generated from:         [facility]


     Initial or Follow up                        Brief Note


     Subjective/Other Information                Screened for malnutrition.


                                                 Pt w/MD at time of visit.


 Nutrition Intervention


     Follow-Up By:                               07/19/19


     Additional Comments                         Follow for malnutrition


                                                 assessment

## 2019-07-20 PROCEDURE — 5A09357 ASSISTANCE WITH RESPIRATORY VENTILATION, LESS THAN 24 CONSECUTIVE HOURS, CONTINUOUS POSITIVE AIRWAY PRESSURE: ICD-10-PCS | Performed by: INTERNAL MEDICINE

## 2019-07-20 RX ADMIN — Medication SCH ML: at 09:20

## 2019-07-20 RX ADMIN — Medication SCH ML: at 00:26

## 2019-07-20 RX ADMIN — Medication SCH ML: at 21:19

## 2019-07-20 RX ADMIN — DEXTROSE AND SODIUM CHLORIDE SCH MLS/HR: 5; .45 INJECTION, SOLUTION INTRAVENOUS at 04:57

## 2019-07-20 RX ADMIN — HYDROMORPHONE HYDROCHLORIDE PRN MG: 1 INJECTION, SOLUTION INTRAMUSCULAR; INTRAVENOUS; SUBCUTANEOUS at 00:25

## 2019-07-20 RX ADMIN — HYDROMORPHONE HYDROCHLORIDE PRN MG: 1 INJECTION, SOLUTION INTRAMUSCULAR; INTRAVENOUS; SUBCUTANEOUS at 19:58

## 2019-07-20 NOTE — PROGRESS NOTE
Assessment and Plan


Assessment and plan: 





59-year-old [place] female with medical history significant for stage IV 

breast cancer, metastasis to the bone and to the spinal cord with spinal 

compression is interested to the emergency department with complaints of 

shortness of breath.  CTA was done in the emergency department and no PE was 

shown.  Patient was recently discharged from [facility] to hospice care.  I 

get the record from [facility] and she was admitted with the same problems. 

patient is full code and family wants everything to be done and don't want 

hospice care instead they want HH at discharge





Acute on chronic hypoxic respiratory failure


- Patient was on home oxygen


- Continue BiPAP


- Pulmonary consult appreciated


- If patient deteriorates will intubate her


Persistent tachycardia


- Continue on Lopressor IV when necessary


Stage IV breast cancer, with metastatsis to the bone and spine, paraplegia


- Supportive care


- Was treated with palliative intent therapy at [place]


Nutrition


- patient is on D5 half normal saline


- Patient refused NG tube feeding


- We can now take off the BiPAP because patient is desaturating


CODE STATUS full


Disposition; continue inpatient care. 








History


Interval history: 





Patient was seen and evaluated this morning, patient is in no respiratory 

distress.  She is on BiPAP.





Hospitalist Physical





- Physical exam


Narrative exam: 





 patient is in respiratory distress. 


 The patient appeared well nourished and normally developed.


 Vital signs as documented.


 Head exam is unremarkable.


 No scleral icterus .


 Neck is without jugular venous distension, thyromegaly, or carotid bruits. 


 Lungs are clear to auscultation.


Cardiac exam reveals tachycardia. 


Abdominal exam reveals normal bowel sounds, no masses, no organomegaly and no 

aortic enlargement. 


Extremities are nonedematous and both femoral and pedal pulses are normal.


CNS:paraplegic.





- Constitutional


Vitals: 


                                        











Temp Pulse Resp BP Pulse Ox


 


 97.8 F   101 H  20   126/75   99 


 


 07/20/19 08:57  07/20/19 10:00  07/20/19 08:57  07/20/19 08:57  07/20/19 08:57














Results





- Labs


CBC & Chem 7: 


                                 07/19/19 04:43





                                 07/19/19 04:43


Labs: 


                             Laboratory Last Values











WBC  6.5 K/mm3 (4.5-11.0)   07/19/19  04:43    


 


RBC  2.80 M/mm3 (3.65-5.03)  L  07/19/19  04:43    


 


Hgb  8.7 gm/dl (10.1-14.3)  L  07/19/19  04:43    


 


Hct  26.5 % (30.3-42.9)  L  07/19/19  04:43    


 


MCV  95 fl (79-97)   07/19/19  04:43    


 


MCH  31 pg (28-32)   07/19/19  04:43    


 


MCHC  33 % (30-34)   07/19/19  04:43    


 


RDW  24.5 % (13.2-15.2)  H  07/19/19  04:43    


 


Plt Count  108 K/mm3 (140-440)  L  07/19/19  04:43    


 


Lymph % (Auto)  Np   07/19/19  04:43    


 


Mono % (Auto)  Np   07/19/19  04:43    


 


Eos % (Auto)  Np   07/19/19  04:43    


 


Baso % (Auto)  Np   07/19/19  04:43    


 


Lymph #  Np   07/19/19  04:43    


 


Mono #  Np   07/19/19  04:43    


 


Eos #  Np   07/19/19  04:43    


 


Baso #  Np   07/19/19  04:43    


 


Add Manual Diff  Complete   07/19/19  04:43    


 


Total Counted  100   07/19/19  04:43    


 


Seg Neutrophils %  Np   07/19/19  04:43    


 


Seg Neuts % (Manual)  70.0 % (40.0-70.0)   07/19/19  04:43    


 


  2.0 %  07/19/19  04:43    


 


  8.0 % (13.4-35.0)  L  07/19/19  04:43    


 


Reactive Lymphs % (Man)  0 %  07/19/19  04:43    


 


  16.0 % (0.0-7.3)  H  07/19/19  04:43    


 


  1.0 % (0.0-4.3)   07/19/19  04:43    


 


  0 % (0.0-1.8)   07/19/19  04:43    


 


  3.0 %  07/19/19  04:43    


 


  0 %  07/19/19  04:43    


 


  0 %  07/19/19  04:43    


 


  0 %  07/19/19  04:43    


 


Nucleated RBC %  43.0 % (0.0-0.9)  H  07/19/19  04:43    


 


Seg Neutrophils #  Np   07/19/19  04:43    


 


Seg Neutrophils # Man  0.0 K/mm3 (1.8-7.7)  L  07/19/19  04:43    


 


Band Neutrophils #  0.0 K/mm3  07/19/19  04:43    


 


  0.0 K/mm3 (1.2-5.4)  L  07/19/19  04:43    


 


Abs React Lymphs (Man)  0.0 K/mm3  07/19/19  04:43    


 


  0.0 K/mm3 (0.0-0.8)   07/19/19  04:43    


 


  0.0 K/mm3 (0.0-0.4)   07/19/19  04:43    


 


  0.0 K/mm3 (0.0-0.1)   07/19/19  04:43    


 


  0.0 K/mm3  07/19/19  04:43    


 


  0.0 K/mm3  07/19/19  04:43    


 


  0.0 K/mm3  07/19/19  04:43    


 


Blast Cells #  0.0 K/mm3  07/19/19  04:43    


 


WBC Morphology  Not Reportable   07/19/19  04:43    


 


Hypersegmented Neuts  Not Reportable   07/19/19  04:43    


 


Hyposegmented Neuts  Not Reportable   07/19/19  04:43    


 


Hypogranular Neuts  Not Reportable   07/19/19  04:43    


 


  Not Reportable   07/19/19  04:43    


 


  Not Reportable   07/19/19  04:43    


 


  Not Reportable   07/19/19  04:43    


 


  Not Reportable   07/19/19  04:43    


 


  Not Reportable   07/19/19  04:43    


 


  Not Reportable   07/19/19  04:43    


 


  Consistent w auto   07/19/19  04:43    


 


  Not Reportable   07/19/19  04:43    


 


Plt Clumps, EDTA  Not Reportable   07/19/19  04:43    


 


  Not Reportable   07/19/19  04:43    


 


  Not Reportable   07/19/19  04:43    


 


  Not Reportable   07/19/19  04:43    


 


Plt Morphology Comment  Not Reportable   07/19/19  04:43    


 


RBC Morphology  Not Reportable   07/19/19  04:43    


 


Dimorphic RBCs  Not Reportable   07/19/19  04:43    


 


  Few   07/19/19  04:43    


 


  Not Reportable   07/19/19  04:43    


 


  1+   07/19/19  04:43    


 


  2+   07/19/19  04:43    


 


  Not Reportable   07/19/19  04:43    


 


  Not Reportable   07/19/19  04:43    


 


  Not Reportable   07/19/19  04:43    


 


  Not Reportable   07/19/19  04:43    


 


  Not Reportable   07/19/19  04:43    


 


  1+   07/19/19  04:43    


 


  Not Reportable   07/19/19  04:43    


 


  Not Reportable   07/19/19  04:43    


 


  Not Reportable   07/19/19  04:43    


 


  Not Reportable   07/19/19  04:43    


 


  Not Reportable   07/19/19  04:43    


 


  Not Reportable   07/19/19  04:43    


 


  Not Reportable   07/19/19  04:43    


 


  Not Reportable   07/19/19  04:43    


 


  Not Reportable   07/19/19  04:43    


 


Acanthocytes (Spur)  Not Reportable   07/19/19  04:43    


 


Rouleaux  Not Reportable   07/19/19  04:43    


 


  Not Reportable   07/19/19  04:43    


 


  Rare   07/19/19  04:43    


 


  Not Reportable   07/19/19  04:43    


 


  Not Reportable   07/19/19  04:43    


 


Hem Pathologist Commnt  No   07/19/19  04:43    


 


PT  15.1 Sec. (12.2-14.9)  H  07/17/19  18:00    


 


INR  1.22  (0.87-1.13)  H  07/17/19  18:00    


 


APTT  27.2 Sec. (24.2-36.6)   07/17/19  18:00    


 


  6342.18 ng/mlDDU (0-234)  H  07/17/19  18:00    


 


POC ABG pH  7.449  (7.35-7.45)   07/18/19  09:49    


 


POC ABG pCO2  39.9  (35-45)   07/18/19  09:49    


 


POC ABG pO2  72  ()  L  07/18/19  09:49    


 


POC ABG HCO3  27.6  (22-26 mml/L)   07/18/19  09:49    


 


POC ABG Total CO2  29  (23-27mmol/L)   07/18/19  09:49    


 


POC ABG O2 Sat  95   07/18/19  09:49    


 


POC ABG Base Excess  4  ((-2) - (+3)mmol/L)   07/18/19  09:49    


 


  35 %  07/18/19  09:49    


 


Sodium  145 mmol/L (137-145)   07/19/19  04:43    


 


Potassium  4.4 mmol/L (3.6-5.0)   07/19/19  04:43    


 


Chloride  108.1 mmol/L ()  H  07/19/19  04:43    


 


Carbon Dioxide  26 mmol/L (22-30)   07/19/19  04:43    


 


  15 mmol/L  07/19/19  04:43    


 


BUN  13 mg/dL (7-17)   07/19/19  04:43    


 


  0.3 mg/dL (0.7-1.2)  L  07/19/19  04:43    


 


Estimated GFR  > 60 ml/min  07/19/19  04:43    


 


  43 %  07/19/19  04:43    


 


Glucose  145 mg/dL ()  H  07/19/19  04:43    


 


Calcium  8.7 mg/dL (8.4-10.2)   07/19/19  04:43    


 


  0.80 mg/dL (0.1-1.2)   07/17/19  18:00    


 


AST  85 units/L (5-40)  H  07/17/19  18:00    


 


ALT  23 units/L (7-56)   07/17/19  18:00    


 


  99 units/L ()   07/17/19  18:00    


 


  132 units/L ()   07/17/19  18:00    


 


CK-MB (CK-2)  3.8 ng/mL (0.0-4.0)   07/17/19  18:00    


 


CK-MB (CK-2) Rel Index  2.8  (0-4)   07/17/19  18:00    


 


  < 0.010 ng/mL (0.00-0.029)   07/17/19  18:00    


 


NT-Pro-B Natriuret Pep  1304 pg/mL (0-900)  H  07/17/19  18:00    


 


  6.6 g/dL (6.3-8.2)   07/17/19  18:00    


 


  3.2 g/dL (3.9-5)  L  07/17/19  18:00    


 


  0.9 %  07/17/19  18:00    














Active Medications





- Current Medications


Current Medications: 














Generic Name Dose Route Start Last Admin





  Trade Name Freq  PRN Reason Stop Dose Admin


 


Acetaminophen  650 mg  07/17/19 23:33 





  Tylenol  PO  





  Q4H PRN  





  Pain MILD(1-3)/Fever >100.5/HA  


 


Acetaminophen/Hydrocodone Bitart  2 each  07/17/19 23:33 





  [place] 5/325  PO  





  Q6H PRN  





  Pain, Moderate (4-6)  


 


Hydromorphone HCl  0.5 mg  07/18/19 09:04  07/20/19 00:25





  Dilaudid  IV   0.5 mg





  Q3H PRN   Administration





  Pain , Severe (7-10)  


 


Dextrose/Sodium Chloride  1,000 mls @ 75 mls/hr  07/18/19 14:00  07/20/19 04:57





  D5/0.45ns  IV   75 mls/hr





  AS DIRECT RAMÓN   Administration


 


Metoprolol Tartrate  2.5 mg  07/18/19 16:33  07/19/19 05:14





  Lopressor  IV   2.5 mg





  Q6HR PRN   Administration





  tachycardia  


 


Ondansetron HCl  4 mg  07/17/19 23:33 





  Zofran  IV  





  Q8H PRN  





  Nausea And Vomiting  


 


Sodium Chloride  10 ml  07/18/19 10:00  07/20/19 09:20





  Sodium Chloride Flush Syringe 10 Ml  IV   10 ml





  BID RAMÓN   Administration


 


Sodium Chloride  10 ml  07/17/19 23:33 





  Sodium Chloride Flush Syringe 10 Ml  IV  





  PRN PRN  





  LINE FLUSH  














Nutrition/Malnutrition Assess





- Dietary Evaluation


Nutrition/Malnutrition Findings: 


                                        





Nutrition Notes                                            Start:  07/18/19 

15:14


Freq:                                                      Status: Active       




Protocol:                                                                       




 Document     07/19/19 17:55  RM  (Rec: 07/19/19 18:27  RM  [id number])


 Nutrition Notes


     Initial or Follow up                        Assessment


     Current Diagnosis                           Hypertension,Respiratory


                                                 Failure


     Other Pertinent Diagnosis                   Metastatic breast CA, CP


     Current Diet                                Regular


     Labs/Tests                                  Reviewed


     Pertinent Medications                       Reviewed


     Height                                      5 ft 6 in


     Weight                                      69.3 kg


     Usual Body Weight                           81.82 kg


     Ideal Body Weight (kg)                      59.09


     BMI                                         24.6


     Weight change and time frame                15.3% wt loss X 1 year


     Subjective/Other Information                Pt and pt daughter in room at


                                                 time of visit. Pt on BiPAP at


                                                 time of visit. Pt daughter


                                                 helped answer questions. Pt


                                                 has been drinking 3 Ensure


                                                 daily w/fruit PTA. Pt has also


                                                 drunk an Ensure since


                                                 admission. Nurse told writer


                                                 that pt does not tolerate


                                                 being off BiPAP well for even


                                                 brief periods of time and


                                                 oxygen levels drop. Pt


                                                 daughter stated pt UBW was 180


                                                 lbs 1 year ago.


                                                 Noted slight temporal wasting.


                                                 Pt daughter requested that pt


                                                 be started on TPN. Writer


                                                 explained that TPN is not


                                                 appropriate yet d/t pt being


                                                 able to tolerate PO intake


                                                 recently PTA. Writer


                                                 recommended to wait to see if


                                                 breathing improves first.


     Burn                                        Absent


     Trauma                                      Absent


     #1


      Nutrition Diagnosis                        Malnutrition


      Etiology                                   metastatic breast CA


      As Evidenced by Signs and Symptoms         pt PTA only consuming 3 Ensure


                                                 Enlive w/fruit daily, slight


                                                 temporal wasting


     Is patient on ventilator?                   No


     Is Patient Ambulatory and/or Out of Bed     No


     REE-([place]-[facility]-confined to bed)      [phone]


     Calculation Used for Recommendations        [facility]


     Additional Notes                            Protein Needs: 83-104g (1.2-1.


                                                 5g/kg)


                                                 Fluid Needs: 1 ml/kcal


 Nutrition Intervention


     Change Diet Order:                          Diet advancement/Improvement


                                                 of SOB


     Goal #1                                     Diet advancement


     Follow-Up By:                               07/22/19


     Additional Comments                         Follow for diet advancement/


                                                 improvement of SOB

## 2019-07-21 LAB
BAND NEUTROPHILS # (MANUAL): 0 K/MM3
BUN SERPL-MCNC: 10 MG/DL (ref 7–17)
BUN/CREAT SERPL: 33 %
CALCIUM SERPL-MCNC: 8.8 MG/DL (ref 8.4–10.2)
HCT VFR BLD CALC: 24.8 % (ref 30.3–42.9)
HEMOLYSIS INDEX: 1
HGB BLD-MCNC: 8.3 GM/DL (ref 10.1–14.3)
MCHC RBC AUTO-ENTMCNC: 34 % (ref 30–34)
MCV RBC AUTO: 94 FL (ref 79–97)
MYELOCYTES # (MANUAL): 0 K/MM3
PLATELET # BLD: 134 K/MM3 (ref 140–440)
PROMYELOCYTES # (MANUAL): 0 K/MM3
RBC # BLD AUTO: 2.65 M/MM3 (ref 3.65–5.03)
TARGETS BLD QL SMEAR: (no result)
TOTAL CELLS COUNTED BLD: 100

## 2019-07-21 PROCEDURE — 5A09357 ASSISTANCE WITH RESPIRATORY VENTILATION, LESS THAN 24 CONSECUTIVE HOURS, CONTINUOUS POSITIVE AIRWAY PRESSURE: ICD-10-PCS | Performed by: INTERNAL MEDICINE

## 2019-07-21 RX ADMIN — Medication SCH: at 21:37

## 2019-07-21 RX ADMIN — DEXTROSE AND SODIUM CHLORIDE SCH MLS/HR: 5; .45 INJECTION, SOLUTION INTRAVENOUS at 12:02

## 2019-07-21 RX ADMIN — Medication SCH ML: at 15:13

## 2019-07-21 RX ADMIN — METOPROLOL TARTRATE PRN MG: 5 INJECTION INTRAVENOUS at 00:35

## 2019-07-21 RX ADMIN — DEXTROSE AND SODIUM CHLORIDE SCH MLS/HR: 5; .45 INJECTION, SOLUTION INTRAVENOUS at 18:56

## 2019-07-21 NOTE — PROGRESS NOTE
Assessment and Plan


Assessment and plan: 





59-year-old [place] female with medical history significant for stage IV 

breast cancer, metastasis to the bone and to the spinal cord with spinal 

compression is interested to the emergency department with complaints of 

shortness of breath.  CTA was done in the emergency department and no PE was 

shown.  Patient was recently discharged from [facility] to hospice care.  I 

get the record from [facility] and she was admitted with the same problems. 

patient is full code and family wants everything to be done and don't want 

hospice care instead they want HH at discharge





Acute on chronic hypoxic respiratory failure


- Patient was on home oxygen


- Continue BiPAP


- Pulmonary consult appreciated


- If patient deteriorates will intubate her


Persistent tachycardia


- Continue on Lopressor IV when necessary


Stage IV breast cancer, with metastatsis to the bone and spine, paraplegia


- Supportive care


- Was treated with palliative intent therapy at [place]


Nutrition


- patient is on D5 half normal saline


- Patient refused NG tube feeding


- We can now take off the BiPAP because patient is desaturating


CODE STATUS full


Disposition; continue inpatient care. 








History


Interval history: 





Patient was seen and evaluated this morning, patient is in no respiratory 

distress.  She is on BiPAP.





Hospitalist Physical





- Physical exam


Narrative exam: 





 patient is in respiratory distress. 


 The patient appeared well nourished and normally developed.


 Vital signs as documented.


 Head exam is unremarkable.


 No scleral icterus .


 Neck is without jugular venous distension, thyromegaly, or carotid bruits. 


 Lungs are clear to auscultation.


Cardiac exam reveals tachycardia. 


Abdominal exam reveals normal bowel sounds, no masses, no organomegaly and no 

aortic enlargement. 


Extremities are nonedematous and both femoral and pedal pulses are normal.


CNS:paraplegic.





Hospitalist Physical





- Constitutional


Vitals: 


                                        











Temp Pulse Resp BP Pulse Ox


 


 98.2 F   111 H  22   123/72   96 


 


 07/21/19 12:54  07/21/19 12:54  07/21/19 12:54  07/21/19 12:54  07/21/19 12:54














Results





- Labs


CBC & Chem 7: 


                                 07/21/19 04:35





                                 07/21/19 04:35


Labs: 


                             Laboratory Last Values











WBC  9.6 K/mm3 (4.5-11.0)   07/21/19  04:35    


 


RBC  2.65 M/mm3 (3.65-5.03)  L  07/21/19  04:35    


 


Hgb  8.3 gm/dl (10.1-14.3)  L  07/21/19  04:35    


 


Hct  24.8 % (30.3-42.9)  L  07/21/19  04:35    


 


MCV  94 fl (79-97)   07/21/19  04:35    


 


MCH  32 pg (28-32)   07/21/19  04:35    


 


MCHC  34 % (30-34)   07/21/19  04:35    


 


RDW  24.3 % (13.2-15.2)  H  07/21/19  04:35    


 


Plt Count  134 K/mm3 (140-440)  L  07/21/19  04:35    


 


Lymph % (Auto)  Np   07/19/19  04:43    


 


Mono % (Auto)  Np   07/19/19  04:43    


 


Eos % (Auto)  Np   07/19/19  04:43    


 


Baso % (Auto)  Np   07/19/19  04:43    


 


Lymph #  Np   07/19/19  04:43    


 


Mono #  Np   07/19/19  04:43    


 


Eos #  Np   07/19/19  04:43    


 


Baso #  Np   07/19/19  04:43    


 


Add Manual Diff  Complete   07/21/19  04:35    


 


Total Counted  100   07/21/19  04:35    


 


Seg Neutrophils %  Np   07/19/19  04:43    


 


Seg Neuts % (Manual)  83.0 % (40.0-70.0)  H  07/21/19  04:35    


 


  0 %  07/21/19  04:35    


 


  9.0 % (13.4-35.0)  L  07/21/19  04:35    


 


Reactive Lymphs % (Man)  1.0 %  07/21/19  04:35    


 


  5.0 % (0.0-7.3)   07/21/19  04:35    


 


  1.0 % (0.0-4.3)   07/21/19  04:35    


 


  0 % (0.0-1.8)   07/21/19  04:35    


 


  1.0 %  07/21/19  04:35    


 


  0 %  07/21/19  04:35    


 


  0 %  07/21/19  04:35    


 


  0 %  07/21/19  04:35    


 


Nucleated RBC %  29.0 % (0.0-0.9)  H  07/21/19  04:35    


 


Seg Neutrophils #  Np   07/19/19  04:43    


 


Seg Neutrophils # Man  8.0 K/mm3 (1.8-7.7)  H  07/21/19  04:35    


 


Band Neutrophils #  0.0 K/mm3  07/21/19  04:35    


 


  0.9 K/mm3 (1.2-5.4)  L  07/21/19  04:35    


 


Abs React Lymphs (Man)  0.1 K/mm3  07/21/19  04:35    


 


  0.5 K/mm3 (0.0-0.8)   07/21/19  04:35    


 


  0.1 K/mm3 (0.0-0.4)   07/21/19  04:35    


 


  0.0 K/mm3 (0.0-0.1)   07/21/19  04:35    


 


  0.1 K/mm3  07/21/19  04:35    


 


  0.0 K/mm3  07/21/19  04:35    


 


  0.0 K/mm3  07/21/19  04:35    


 


Blast Cells #  0.0 K/mm3  07/21/19  04:35    


 


WBC Morphology  Not Reportable   07/21/19  04:35    


 


Hypersegmented Neuts  Not Reportable   07/21/19  04:35    


 


Hyposegmented Neuts  Not Reportable   07/21/19  04:35    


 


Hypogranular Neuts  Not Reportable   07/21/19  04:35    


 


  Not Reportable   07/21/19  04:35    


 


  Not Reportable   07/21/19  04:35    


 


  Not Reportable   07/21/19  04:35    


 


  Not Reportable   07/21/19  04:35    


 


  Not Reportable   07/21/19  04:35    


 


  Not Reportable   07/21/19  04:35    


 


  Consistent w auto   07/21/19  04:35    


 


  Not Reportable   07/21/19  04:35    


 


Plt Clumps, EDTA  Not Reportable   07/21/19  04:35    


 


  Not Reportable   07/21/19  04:35    


 


  Few   07/21/19  04:35    


 


  Not Reportable   07/21/19  04:35    


 


Plt Morphology Comment  Not Reportable   07/21/19  04:35    


 


RBC Morphology  Not Reportable   07/21/19  04:35    


 


Dimorphic RBCs  Not Reportable   07/21/19  04:35    


 


  Few   07/21/19  04:35    


 


  Few   07/21/19  04:35    


 


  Not Reportable   07/21/19  04:35    


 


  Not Reportable   07/21/19  04:35    


 


  Not Reportable   07/21/19  04:35    


 


  Not Reportable   07/21/19  04:35    


 


  Not Reportable   07/21/19  04:35    


 


  Not Reportable   07/21/19  04:35    


 


  Not Reportable   07/21/19  04:35    


 


  1+   07/21/19  04:35    


 


  Not Reportable   07/21/19  04:35    


 


  Not Reportable   07/21/19  04:35    


 


  Not Reportable   07/21/19  04:35    


 


  Not Reportable   07/21/19  04:35    


 


  Not Reportable   07/21/19  04:35    


 


  Not Reportable   07/21/19  04:35    


 


  Not Reportable   07/21/19  04:35    


 


  Not Reportable   07/21/19  04:35    


 


  Not Reportable   07/21/19  04:35    


 


Acanthocytes (Spur)  Not Reportable   07/21/19  04:35    


 


Rouleaux  Not Reportable   07/21/19  04:35    


 


  Not Reportable   07/21/19  04:35    


 


  Not Reportable   07/21/19  04:35    


 


  Not Reportable   07/21/19  04:35    


 


  Not Reportable   07/21/19  04:35    


 


Hem Pathologist Commnt  No   07/21/19  04:35    


 


PT  15.1 Sec. (12.2-14.9)  H  07/17/19  18:00    


 


INR  1.22  (0.87-1.13)  H  07/17/19  18:00    


 


APTT  27.2 Sec. (24.2-36.6)   07/17/19  18:00    


 


  6342.18 ng/mlDDU (0-234)  H  07/17/19  18:00    


 


POC ABG pH  7.449  (7.35-7.45)   07/18/19  09:49    


 


POC ABG pCO2  39.9  (35-45)   07/18/19  09:49    


 


POC ABG pO2  72  ()  L  07/18/19  09:49    


 


POC ABG HCO3  27.6  (22-26 mml/L)   07/18/19  09:49    


 


POC ABG Total CO2  29  (23-27mmol/L)   07/18/19  09:49    


 


POC ABG O2 Sat  95   07/18/19  09:49    


 


POC ABG Base Excess  4  ((-2) - (+3)mmol/L)   07/18/19  09:49    


 


  35 %  07/18/19  09:49    


 


Sodium  139 mmol/L (137-145)   07/21/19  04:35    


 


Potassium  3.6 mmol/L (3.6-5.0)   07/21/19  04:35    


 


Chloride  102.2 mmol/L ()   07/21/19  04:35    


 


Carbon Dioxide  26 mmol/L (22-30)   07/21/19  04:35    


 


  14 mmol/L  07/21/19  04:35    


 


BUN  10 mg/dL (7-17)   07/21/19  04:35    


 


  0.3 mg/dL (0.7-1.2)  L  07/21/19  04:35    


 


Estimated GFR  > 60 ml/min  07/21/19  04:35    


 


  33 %  07/21/19  04:35    


 


Glucose  93 mg/dL ()   07/21/19  04:35    


 


Calcium  8.8 mg/dL (8.4-10.2)   07/21/19  04:35    


 


  0.80 mg/dL (0.1-1.2)   07/17/19  18:00    


 


AST  85 units/L (5-40)  H  07/17/19  18:00    


 


ALT  23 units/L (7-56)   07/17/19  18:00    


 


  99 units/L ()   07/17/19  18:00    


 


  132 units/L ()   07/17/19  18:00    


 


CK-MB (CK-2)  3.8 ng/mL (0.0-4.0)   07/17/19  18:00    


 


CK-MB (CK-2) Rel Index  2.8  (0-4)   07/17/19  18:00    


 


  < 0.010 ng/mL (0.00-0.029)   07/17/19  18:00    


 


NT-Pro-B Natriuret Pep  1304 pg/mL (0-900)  H  07/17/19  18:00    


 


  6.6 g/dL (6.3-8.2)   07/17/19  18:00    


 


  3.2 g/dL (3.9-5)  L  07/17/19  18:00    


 


  0.9 %  07/17/19  18:00    














Active Medications





- Current Medications


Current Medications: 














Generic Name Dose Route Start Last Admin





  Trade Name Freq  PRN Reason Stop Dose Admin


 


Acetaminophen  650 mg  07/17/19 23:33 





  Tylenol  PO  





  Q4H PRN  





  Pain MILD(1-3)/Fever >100.5/HA  


 


Acetaminophen/Hydrocodone Bitart  2 each  07/17/19 23:33 





  [place] 5/325  PO  





  Q6H PRN  





  Pain, Moderate (4-6)  


 


Hydromorphone HCl  0.5 mg  07/18/19 09:04  07/20/19 19:58





  Dilaudid  IV   0.5 mg





  Q3H PRN   Administration





  Pain , Severe (7-10)  


 


Dextrose/Sodium Chloride  1,000 mls @ 75 mls/hr  07/18/19 14:00  07/21/19 12:02





  D5/0.45ns  IV   75 mls/hr





  AS DIRECT RAMÓN   Administration


 


Metoprolol Tartrate  2.5 mg  07/18/19 16:33  07/21/19 00:35





  Lopressor  IV   2.5 mg





  Q6HR PRN   Administration





  tachycardia  


 


Ondansetron HCl  4 mg  07/17/19 23:33 





  Zofran  IV  





  Q8H PRN  





  Nausea And Vomiting  


 


Sodium Chloride  10 ml  07/18/19 10:00  07/21/19 15:13





  Sodium Chloride Flush Syringe 10 Ml  IV   10 ml





  BID RAMÓN   Administration


 


Sodium Chloride  10 ml  07/17/19 23:33 





  Sodium Chloride Flush Syringe 10 Ml  IV  





  PRN PRN  





  LINE FLUSH  














Nutrition/Malnutrition Assess





- Dietary Evaluation


Nutrition/Malnutrition Findings: 


                                        





Nutrition Notes                                            Start:  07/18/19 

15:14


Freq:                                                      Status: Active       




Protocol:                                                                       




 Document     07/19/19 17:55  RM  (Rec: 07/19/19 18:27  RM  [id number])


 Nutrition Notes


     Initial or Follow up                        Assessment


     Current Diagnosis                           Hypertension,Respiratory


                                                 Failure


     Other Pertinent Diagnosis                   Metastatic breast CA, CP


     Current Diet                                Regular


     Labs/Tests                                  Reviewed


     Pertinent Medications                       Reviewed


     Height                                      5 ft 6 in


     Weight                                      69.3 kg


     Usual Body Weight                           81.82 kg


     Ideal Body Weight (kg)                      59.09


     BMI                                         24.6


     Weight change and time frame                15.3% wt loss X 1 year


     Subjective/Other Information                Pt and pt daughter in room at


                                                 time of visit. Pt on BiPAP at


                                                 time of visit. Pt daughter


                                                 helped answer questions. Pt


                                                 has been drinking 3 Ensure


                                                 daily w/fruit PTA. Pt has also


                                                 drunk an Ensure since


                                                 admission. Nurse told writer


                                                 that pt does not tolerate


                                                 being off BiPAP well for even


                                                 brief periods of time and


                                                 oxygen levels drop. Pt


                                                 daughter stated pt UBW was 180


                                                 lbs 1 year ago.


                                                 Noted slight temporal wasting.


                                                 Pt daughter requested that pt


                                                 be started on TPN. Writer


                                                 explained that TPN is not


                                                 appropriate yet d/t pt being


                                                 able to tolerate PO intake


                                                 recently PTA. Writer


                                                 recommended to wait to see if


                                                 breathing improves first.


     Burn                                        Absent


     Trauma                                      Absent


     #1


      Nutrition Diagnosis                        Malnutrition


      Etiology                                   metastatic breast CA


      As Evidenced by Signs and Symptoms         pt PTA only consuming 3 Ensure


                                                 Enlive w/fruit daily, slight


                                                 temporal wasting


     Is patient on ventilator?                   No


     Is Patient Ambulatory and/or Out of Bed     No


     REE-(Pet[place]-St. Jeor-confined to bed)      [phone]


     Calculation Used for Recommendations        [facility]


     Additional Notes                            Protein Needs: 83-104g (1.2-1.


                                                 5g/kg)


                                                 Fluid Needs: 1 ml/kcal


 Nutrition Intervention


     Change Diet Order:                          Diet advancement/Improvement


                                                 of SOB


     Goal #1                                     Diet advancement


     Follow-Up By:                               07/22/19


     Additional Comments                         Follow for diet advancement/


                                                 improvement of SOB

## 2019-07-22 PROCEDURE — 5A09357 ASSISTANCE WITH RESPIRATORY VENTILATION, LESS THAN 24 CONSECUTIVE HOURS, CONTINUOUS POSITIVE AIRWAY PRESSURE: ICD-10-PCS | Performed by: INTERNAL MEDICINE

## 2019-07-22 RX ADMIN — Medication SCH ML: at 13:36

## 2019-07-22 RX ADMIN — DEXTROSE AND SODIUM CHLORIDE SCH MLS/HR: 5; .45 INJECTION, SOLUTION INTRAVENOUS at 22:12

## 2019-07-22 RX ADMIN — HYDROMORPHONE HYDROCHLORIDE PRN MG: 1 INJECTION, SOLUTION INTRAMUSCULAR; INTRAVENOUS; SUBCUTANEOUS at 08:44

## 2019-07-22 RX ADMIN — Medication SCH: at 22:14

## 2019-07-22 RX ADMIN — DEXTROSE AND SODIUM CHLORIDE SCH MLS/HR: 5; .45 INJECTION, SOLUTION INTRAVENOUS at 08:34

## 2019-07-22 NOTE — PROGRESS NOTE
Assessment and Plan


Assessment and plan: 





59-year-old [place] female with medical history significant for stage IV 

breast cancer, metastasis to the bone and to the spinal cord with spinal 

compression is interested to the emergency department with complaints of 

shortness of breath.  CTA was done in the emergency department and no PE was 

shown.  Patient was recently discharged from [facility] to hospice care.  I 

get the record from [facility] and she was admitted with the same problems. 

patient is full code and family wants everything to be done and don't want 

hospice care instead they want HH at discharge





Acute on chronic hypoxic respiratory failure


- Patient was on home oxygen


- Continue BiPAP


- Pulmonary consult appreciated


- If patient deteriorates will intubate her


Persistent tachycardia


- Continue on Lopressor IV when necessary


Stage IV breast cancer, with metastatsis to the bone and spine, paraplegia


- Supportive care


- Was treated with palliative intent therapy at [place]


Nutrition


- patient is on D5 half normal saline


- Patient refused NG tube feeding


- We can not take off the BiPAP because patient is desaturating


CODE STATUS full


Disposition; continue inpatient care. 





Poor prognosis, dw family, recommend that she return to hospice








History


Interval history: 





Patient was seen and evaluated this morning, patient is in no respiratory 

distress.  She is on BiPAP.





Hospitalist Physical





- Physical exam


Narrative exam: 





 patient is in respiratory distress. 


 The patient appeared well nourished and normally developed.


 Vital signs as documented.


 Head exam is unremarkable.


 No scleral icterus .


 Neck is without jugular venous distension, thyromegaly, or carotid bruits. 


 Lungs are clear to auscultation.


Cardiac exam reveals tachycardia. 


Abdominal exam reveals normal bowel sounds, no masses, no organomegaly and no 

aortic enlargement. 


Extremities are nonedematous and both femoral and pedal pulses are normal.


CNS:paraplegic.





Hospitalist Physical





- Constitutional


Vitals: 


                                        











Temp Pulse Resp BP Pulse Ox


 


 97.9 F   113 H  19   129/84   99 


 


 19 11:57  19 12:00  19 12:00  19 11:57  19 12:00














Results





- Labs


CBC & Chem 7: 


                                 19 04:35





                                 19 04:35


Labs: 


                             Laboratory Last Values











WBC  9.6 K/mm3 (4.5-11.0)   19  04:35    


 


RBC  2.65 M/mm3 (3.65-5.03)  L  19  04:35    


 


Hgb  8.3 gm/dl (10.1-14.3)  L  19  04:35    


 


Hct  24.8 % (30.3-42.9)  L  19  04:35    


 


MCV  94 fl (79-97)   19  04:35    


 


MCH  32 pg (28-32)   19  04:35    


 


MCHC  34 % (30-34)   19  04:35    


 


RDW  24.3 % (13.2-15.2)  H  19  04:35    


 


Plt Count  134 K/mm3 (140-440)  L  19  04:35    


 


Lymph % (Auto)  Np   19  04:43    


 


Mono % (Auto)  Np   19  04:43    


 


Eos % (Auto)  Np   19  04:43    


 


Baso % (Auto)  Np   19  04:43    


 


Lymph #  Np   19  04:43    


 


Mono #  Np   19  04:43    


 


Eos #  Np   19  04:43    


 


Baso #  Np   19  04:43    


 


Add Manual Diff  Complete   19  04:35    


 


Total Counted  100   19  04:35    


 


Seg Neutrophils %  Np   19  04:43    


 


Seg Neuts % (Manual)  83.0 % (40.0-70.0)  H  19  04:35    


 


  0 %  19  04:35    


 


  9.0 % (13.4-35.0)  L  19  04:35    


 


Reactive Lymphs % (Man)  1.0 %  19  04:35    


 


  5.0 % (0.0-7.3)   19  04:35    


 


  1.0 % (0.0-4.3)   19  04:35    


 


  0 % (0.0-1.8)   19  04:35    


 


  1.0 %  19  04:35    


 


  0 %  19  04:35    


 


  0 %  19  04:35    


 


  0 %  19  04:35    


 


Nucleated RBC %  29.0 % (0.0-0.9)  H  19  04:35    


 


Seg Neutrophils #  Np   19  04:43    


 


Seg Neutrophils # Man  8.0 K/mm3 (1.8-7.7)  H  19  04:35    


 


Band Neutrophils #  0.0 K/mm3  19  04:35    


 


  0.9 K/mm3 (1.2-5.4)  L  19  04:35    


 


Abs React Lymphs (Man)  0.1 K/mm3  19  04:35    


 


  0.5 K/mm3 (0.0-0.8)   19  04:35    


 


  0.1 K/mm3 (0.0-0.4)   19  04:35    


 


  0.0 K/mm3 (0.0-0.1)   19  04:35    


 


  0.1 K/mm3  19  04:35    


 


  0.0 K/mm3  19  04:35    


 


  0.0 K/mm3  19  04:35    


 


Blast Cells #  0.0 K/mm3  19  04:35    


 


WBC Morphology  Not Reportable   19  04:35    


 


Hypersegmented Neuts  Not Reportable   19  04:35    


 


Hyposegmented Neuts  Not Reportable   19  04:35    


 


Hypogranular Neuts  Not Reportable   19  04:35    


 


  Not Reportable   19  04:35    


 


  Not Reportable   19  04:35    


 


  Not Reportable   19  04:35    


 


  Not Reportable   19  04:35    


 


  Not Reportable   19  04:35    


 


  Not Reportable   19  04:35    


 


  Consistent w auto   19  04:35    


 


  Not Reportable   19  04:35    


 


Plt Clumps, EDTA  Not Reportable   19  04:35    


 


  Not Reportable   19  04:35    


 


  Few   19  04:35    


 


  Not Reportable   19  04:35    


 


Plt Morphology Comment  Not Reportable   19  04:35    


 


RBC Morphology  Not Reportable   19  04:35    


 


Dimorphic RBCs  Not Reportable   19  04:35    


 


  Few   19  04:35    


 


  Few   19  04:35    


 


  Not Reportable   19  04:35    


 


  Not Reportable   19  04:35    


 


  Not Reportable   19  04:35    


 


  Not Reportable   19  04:35    


 


  Not Reportable   19  04:35    


 


  Not Reportable   19  04:35    


 


  Not Reportable   19  04:35    


 


  1+   19  04:35    


 


  Not Reportable   19  04:35    


 


  Not Reportable   19  04:35    


 


  Not Reportable   19  04:35    


 


  Not Reportable   19  04:35    


 


  Not Reportable   19  04:35    


 


  Not Reportable   19  04:35    


 


  Not Reportable   19  04:35    


 


  Not Reportable   19  04:35    


 


  Not Reportable   19  04:35    


 


Acanthocytes (Spur)  Not Reportable   19  04:35    


 


Rouleaux  Not Reportable   19  04:35    


 


  Not Reportable   19  04:35    


 


  Not Reportable   19  04:35    


 


  Not Reportable   19  04:35    


 


  Not Reportable   19  04:35    


 


Hem Pathologist Commnt  No   19  04:35    


 


PT  15.1 Sec. (12.2-14.9)  H  19  18:00    


 


INR  1.22  (0.87-1.13)  H  19  18:00    


 


APTT  27.2 Sec. (24.2-36.6)   19  18:00    


 


  6342.18 ng/mlDDU (0-234)  H  19  18:00    


 


POC ABG pH  7.449  (7.35-7.45)   19  09:49    


 


POC ABG pCO2  39.9  (35-45)   19  09:49    


 


POC ABG pO2  72  ()  L  19  09:49    


 


POC ABG HCO3  27.6  (22-26 mml/L)   19  09:49    


 


POC ABG Total CO2  29  (23-27mmol/L)   19  09:49    


 


POC ABG O2 Sat  95   19  09:49    


 


POC ABG Base Excess  4  ((-2) - (+3)mmol/L)   19  09:49    


 


  35 %  19  09:49    


 


Sodium  139 mmol/L (137-145)   19  04:35    


 


Potassium  3.6 mmol/L (3.6-5.0)   19  04:35    


 


Chloride  102.2 mmol/L ()   19  04:35    


 


Carbon Dioxide  26 mmol/L (22-30)   19  04:35    


 


  14 mmol/L  19  04:35    


 


BUN  10 mg/dL (7-17)   19  04:35    


 


  0.3 mg/dL (0.7-1.2)  L  19  04:35    


 


Estimated GFR  > 60 ml/min  19  04:35    


 


  33 %  19  04:35    


 


Glucose  93 mg/dL ()   19  04:35    


 


POC Glucose  104  ()   19  12:03    


 


Calcium  8.8 mg/dL (8.4-10.2)   19  04:35    


 


  0.80 mg/dL (0.1-1.2)   19  18:00    


 


AST  85 units/L (5-40)  H  19  18:00    


 


ALT  23 units/L (7-56)   19  18:00    


 


  99 units/L ()   19  18:00    


 


  132 units/L ()   19  18:00    


 


CK-MB (CK-2)  3.8 ng/mL (0.0-4.0)   19  18:00    


 


CK-MB (CK-2) Rel Index  2.8  (0-4)   19  18:00    


 


  < 0.010 ng/mL (0.00-0.029)   19  18:00    


 


NT-Pro-B Natriuret Pep  1304 pg/mL (0-900)  H  19  18:00    


 


  6.6 g/dL (6.3-8.2)   19  18:00    


 


  3.2 g/dL (3.9-5)  L  19  18:00    


 


  0.9 %  19  18:00    














Active Medications





- Current Medications


Current Medications: 














Generic Name Dose Route Start Last Admin





  Trade Name Freq  PRN Reason Stop Dose Admin


 


Acetaminophen  650 mg  19 23:33 





  Tylenol  PO  





  Q4H PRN  





  Pain MILD(1-3)/Fever >100.5/HA  


 


Acetaminophen/Hydrocodone Bitart  2 each  19 23:33 





  [place] 5/325  PO  





  Q6H PRN  





  Pain, Moderate (4-6)  


 


Hydromorphone HCl  0.5 mg  19 09:04  19 08:44





  Dilaudid  IV   0.5 mg





  Q3H PRN   Administration





  Pain , Severe (7-10)  


 


Dextrose/Sodium Chloride  1,000 mls @ 75 mls/hr  19 14:00  19 08:34





  D5/0.45ns  IV   75 mls/hr





  AS DIRECT RAMÓN   Administration


 


Metoprolol Tartrate  2.5 mg  19 16:33  19 00:35





  Lopressor  IV   2.5 mg





  Q6HR PRN   Administration





  tachycardia  


 


Ondansetron HCl  4 mg  19 23:33 





  Zofran  IV  





  Q8H PRN  





  Nausea And Vomiting  


 


Sodium Chloride  10 ml  19 10:00  19 13:36





  Sodium Chloride Flush Syringe 10 Ml  IV   10 ml





  BID RAMÓN   Administration


 


Sodium Chloride  10 ml  19 23:33 





  Sodium Chloride Flush Syringe 10 Ml  IV  





  PRN PRN  





  LINE FLUSH  














Nutrition/Malnutrition Assess





- Dietary Evaluation


Nutrition/Malnutrition Findings: 


                                        





Nutrition Notes                                            Start:  19 

15:14


Freq:                                                      Status: Active       




Protocol:                                                                       




 Document     19 13:52  CARLOS  (Rec: 19 13:57  CARLOS  SRW-

[email or web address])


 Nutrition Notes


     Initial or Follow up                        Reassessment


     Other Pertinent Diagnosis                   Acute on chronic resp failure,


                                                 metastatic breast CA


     Current Diet                                Regular


     Labs/Tests                                  Reviewed


     Pertinent Medications                       Reviewed


     Height                                      5 ft 6 in


     Weight                                      72.4 kg


     Ideal Body Weight (kg)                      59.09


     BMI                                         25.7


     Subjective/Other Information                Pt BiPap at time of visit this


                                                 am.  Pt agrees that it is


                                                 easier to drink than chew food


                                                 at this time.  Her son has


                                                 been feeding her smoothies


                                                 made from whole foods; says pt


                                                 is on a "holistic" diet and


                                                 cannot eat the foods served in


                                                 [facility].


     Burn                                        Absent


     Trauma                                      Absent


     #1


      Nutrition Diagnosis                        Malnutrition


      Diagnosis Progress(for reassessment        Continues


       documentation)                            


     Is patient on ventilator?                   No


     Is Patient Ambulatory and/or Out of Bed     No


     REE-([facility]-confined to bed)      [phone]


     Calculation Used for Recommendations        [facility]


     Additional Notes                            Pro needs 1-1.2g/k-87g/


                                                 day


                                                 Fluid needs 1ml/kcal


 Nutrition Intervention


     Change Diet Order:                          Continue current diet order


     Goal #1                                     PO intakes to meet nutrient


                                                 needs as best possible


     Follow-Up By:                               19


     Additional Comments                         F/U: intakes

## 2019-07-23 PROCEDURE — 5A09357 ASSISTANCE WITH RESPIRATORY VENTILATION, LESS THAN 24 CONSECUTIVE HOURS, CONTINUOUS POSITIVE AIRWAY PRESSURE: ICD-10-PCS | Performed by: INTERNAL MEDICINE

## 2019-07-23 RX ADMIN — HYDROMORPHONE HYDROCHLORIDE PRN MG: 1 INJECTION, SOLUTION INTRAMUSCULAR; INTRAVENOUS; SUBCUTANEOUS at 08:53

## 2019-07-23 RX ADMIN — DEXTROSE AND SODIUM CHLORIDE SCH MLS/HR: 5; .45 INJECTION, SOLUTION INTRAVENOUS at 10:23

## 2019-07-23 RX ADMIN — HYDROMORPHONE HYDROCHLORIDE PRN MG: 1 INJECTION, SOLUTION INTRAMUSCULAR; INTRAVENOUS; SUBCUTANEOUS at 18:50

## 2019-07-23 RX ADMIN — DEXTROSE AND SODIUM CHLORIDE SCH MLS/HR: 5; .45 INJECTION, SOLUTION INTRAVENOUS at 23:33

## 2019-07-23 RX ADMIN — Medication SCH ML: at 23:32

## 2019-07-23 RX ADMIN — Medication SCH ML: at 09:05

## 2019-07-23 NOTE — PROGRESS NOTE
Assessment and Plan


Assessment and plan: 





59-year-old [place] female with medical history significant for stage IV 

breast cancer, metastasis to the bone and to the spinal cord with spinal 

compression is interested to the emergency department with complaints of 

shortness of breath.  CTA was done in the emergency department and no PE was 

shown.  Patient was recently discharged from [facility] to hospice care.  I 

get the record from [facility] and she was admitted with the same problems. 

patient is full code and family wants everything to be done and don't want 

hospice care instead they want HH at discharge





Acute on chronic hypoxic respiratory failure


- Patient was on home oxygen


- Continue BiPAP


- Pulmonary consult appreciated


- If patient deteriorates will intubate her


Persistent tachycardia


- Continue on Lopressor IV when necessary


Stage IV breast cancer, with metastatsis to the bone and spine, paraplegia


- Supportive care


- Was treated with palliative intent therapy at [place]


Nutrition


- patient is on D5 half normal saline


- Patient refused NG tube feeding


- We can not take off the BiPAP because patient is desaturating


CODE STATUS full


Disposition; continue inpatient care. 





Poor prognosis, dw family, recommend that she return to hospice








History


Interval history: 





Patient was seen and evaluated this morning, patient is in no respiratory 

distress.  She is on BiPAP.





Hospitalist Physical





- Physical exam


Narrative exam: 





 patient is in respiratory distress. 


 The patient appeared well nourished and normally developed.


 Vital signs as documented.


 Head exam is unremarkable.


 No scleral icterus .


 Neck is without jugular venous distension, thyromegaly, or carotid bruits. 


 Lungs are clear to auscultation.


Cardiac exam reveals tachycardia. 


Abdominal exam reveals normal bowel sounds, no masses, no organomegaly and no 

aortic enlargement. 


Extremities are nonedematous and both femoral and pedal pulses are normal.


CNS:paraplegic.





Hospitalist Physical





- Constitutional


Vitals: 


                                        











Temp Pulse Resp BP Pulse Ox


 


 98.9 F   110 H  20   106/58   98 


 


 19 11:46  19 12:00  19 12:00  19 11:46  19 12:00














Results





- Labs


CBC & Chem 7: 


                                 19 04:35





                                 19 04:35


Labs: 


                             Laboratory Last Values











WBC  9.6 K/mm3 (4.5-11.0)   19  04:35    


 


RBC  2.65 M/mm3 (3.65-5.03)  L  19  04:35    


 


Hgb  8.3 gm/dl (10.1-14.3)  L  19  04:35    


 


Hct  24.8 % (30.3-42.9)  L  19  04:35    


 


MCV  94 fl (79-97)   19  04:35    


 


MCH  32 pg (28-32)   19  04:35    


 


MCHC  34 % (30-34)   19  04:35    


 


RDW  24.3 % (13.2-15.2)  H  19  04:35    


 


Plt Count  134 K/mm3 (140-440)  L  19  04:35    


 


Lymph % (Auto)  Np   19  04:43    


 


Mono % (Auto)  Np   19  04:43    


 


Eos % (Auto)  Np   19  04:43    


 


Baso % (Auto)  Np   19  04:43    


 


Lymph #  Np   19  04:43    


 


Mono #  Np   19  04:43    


 


Eos #  Np   19  04:43    


 


Baso #  Np   19  04:43    


 


Add Manual Diff  Complete   19  04:35    


 


Total Counted  100   19  04:35    


 


Seg Neutrophils %  Np   19  04:43    


 


Seg Neuts % (Manual)  83.0 % (40.0-70.0)  H  19  04:35    


 


  0 %  19  04:35    


 


  9.0 % (13.4-35.0)  L  19  04:35    


 


Reactive Lymphs % (Man)  1.0 %  19  04:35    


 


  5.0 % (0.0-7.3)   19  04:35    


 


  1.0 % (0.0-4.3)   19  04:35    


 


  0 % (0.0-1.8)   19  04:35    


 


  1.0 %  19  04:35    


 


  0 %  19  04:35    


 


  0 %  19  04:35    


 


  0 %  19  04:35    


 


Nucleated RBC %  29.0 % (0.0-0.9)  H  19  04:35    


 


Seg Neutrophils #  Np   19  04:43    


 


Seg Neutrophils # Man  8.0 K/mm3 (1.8-7.7)  H  19  04:35    


 


Band Neutrophils #  0.0 K/mm3  19  04:35    


 


  0.9 K/mm3 (1.2-5.4)  L  19  04:35    


 


Abs React Lymphs (Man)  0.1 K/mm3  19  04:35    


 


  0.5 K/mm3 (0.0-0.8)   19  04:35    


 


  0.1 K/mm3 (0.0-0.4)   19  04:35    


 


  0.0 K/mm3 (0.0-0.1)   19  04:35    


 


  0.1 K/mm3  19  04:35    


 


  0.0 K/mm3  19  04:35    


 


  0.0 K/mm3  19  04:35    


 


Blast Cells #  0.0 K/mm3  19  04:35    


 


WBC Morphology  Not Reportable   19  04:35    


 


Hypersegmented Neuts  Not Reportable   19  04:35    


 


Hyposegmented Neuts  Not Reportable   19  04:35    


 


Hypogranular Neuts  Not Reportable   19  04:35    


 


  Not Reportable   19  04:35    


 


  Not Reportable   19  04:35    


 


  Not Reportable   19  04:35    


 


  Not Reportable   19  04:35    


 


  Not Reportable   19  04:35    


 


  Not Reportable   19  04:35    


 


  Consistent w auto   19  04:35    


 


  Not Reportable   19  04:35    


 


Plt Clumps, EDTA  Not Reportable   19  04:35    


 


  Not Reportable   19  04:35    


 


  Few   19  04:35    


 


  Not Reportable   19  04:35    


 


Plt Morphology Comment  Not Reportable   19  04:35    


 


RBC Morphology  Not Reportable   19  04:35    


 


Dimorphic RBCs  Not Reportable   19  04:35    


 


  Few   19  04:35    


 


  Few   19  04:35    


 


  Not Reportable   19  04:35    


 


  Not Reportable   19  04:35    


 


  Not Reportable   19  04:35    


 


  Not Reportable   19  04:35    


 


  Not Reportable   19  04:35    


 


  Not Reportable   19  04:35    


 


  Not Reportable   19  04:35    


 


  1+   19  04:35    


 


  Not Reportable   19  04:35    


 


  Not Reportable   19  04:35    


 


  Not Reportable   19  04:35    


 


  Not Reportable   19  04:35    


 


  Not Reportable   19  04:35    


 


  Not Reportable   19  04:35    


 


  Not Reportable   19  04:35    


 


  Not Reportable   19  04:35    


 


  Not Reportable   19  04:35    


 


Acanthocytes (Spur)  Not Reportable   19  04:35    


 


Rouleaux  Not Reportable   19  04:35    


 


  Not Reportable   19  04:35    


 


  Not Reportable   19  04:35    


 


  Not Reportable   19  04:35    


 


  Not Reportable   19  04:35    


 


Hem Pathologist Commnt  No   19  04:35    


 


PT  15.1 Sec. (12.2-14.9)  H  19  18:00    


 


INR  1.22  (0.87-1.13)  H  19  18:00    


 


APTT  27.2 Sec. (24.2-36.6)   19  18:00    


 


  6342.18 ng/mlDDU (0-234)  H  19  18:00    


 


POC ABG pH  7.449  (7.35-7.45)   19  09:49    


 


POC ABG pCO2  39.9  (35-45)   19  09:49    


 


POC ABG pO2  72  ()  L  19  09:49    


 


POC ABG HCO3  27.6  (22-26 mml/L)   19  09:49    


 


POC ABG Total CO2  29  (23-27mmol/L)   19  09:49    


 


POC ABG O2 Sat  95   19  09:49    


 


POC ABG Base Excess  4  ((-2) - (+3)mmol/L)   19  09:49    


 


  35 %  19  09:49    


 


Sodium  139 mmol/L (137-145)   19  04:35    


 


Potassium  3.6 mmol/L (3.6-5.0)   19  04:35    


 


Chloride  102.2 mmol/L ()   19  04:35    


 


Carbon Dioxide  26 mmol/L (22-30)   19  04:35    


 


  14 mmol/L  19  04:35    


 


BUN  10 mg/dL (7-17)   19  04:35    


 


  0.3 mg/dL (0.7-1.2)  L  19  04:35    


 


Estimated GFR  > 60 ml/min  19  04:35    


 


  33 %  19  04:35    


 


Glucose  93 mg/dL ()   19  04:35    


 


POC Glucose  134  ()  H  19  16:41    


 


Calcium  8.8 mg/dL (8.4-10.2)   19  04:35    


 


  0.80 mg/dL (0.1-1.2)   19  18:00    


 


AST  85 units/L (5-40)  H  19  18:00    


 


ALT  23 units/L (7-56)   19  18:00    


 


  99 units/L ()   19  18:00    


 


  132 units/L ()   19  18:00    


 


CK-MB (CK-2)  3.8 ng/mL (0.0-4.0)   19  18:00    


 


CK-MB (CK-2) Rel Index  2.8  (0-4)   19  18:00    


 


  < 0.010 ng/mL (0.00-0.029)   19  18:00    


 


NT-Pro-B Natriuret Pep  1304 pg/mL (0-900)  H  19  18:00    


 


  6.6 g/dL (6.3-8.2)   19  18:00    


 


  3.2 g/dL (3.9-5)  L  19  18:00    


 


  0.9 %  19  18:00    














Active Medications





- Current Medications


Current Medications: 














Generic Name Dose Route Start Last Admin





  Trade Name Freq  PRN Reason Stop Dose Admin


 


Acetaminophen  650 mg  19 23:33 





  Tylenol  PO  





  Q4H PRN  





  Pain MILD(1-3)/Fever >100.5/HA  


 


Acetaminophen/Hydrocodone Bitart  2 each  19 23:33 





  [place] 5/325  PO  





  Q6H PRN  





  Pain, Moderate (4-6)  


 


Hydromorphone HCl  0.5 mg  19 09:04  19 08:53





  Dilaudid  IV   0.5 mg





  Q3H PRN   Administration





  Pain , Severe (7-10)  


 


Dextrose/Sodium Chloride  1,000 mls @ 75 mls/hr  19 14:00  19 10:23





  D5/0.45ns  IV   75 mls/hr





  AS DIRECT RAMÓN   Administration


 


Metoprolol Tartrate  2.5 mg  19 16:33  19 00:35





  Lopressor  IV   2.5 mg





  Q6HR PRN   Administration





  tachycardia  


 


Ondansetron HCl  4 mg  19 23:33 





  Zofran  IV  





  Q8H PRN  





  Nausea And Vomiting  


 


Sodium Chloride  10 ml  19 10:00  19 09:05





  Sodium Chloride Flush Syringe 10 Ml  IV   10 ml





  BID RAMÓN   Administration


 


Sodium Chloride  10 ml  19 23:33 





  Sodium Chloride Flush Syringe 10 Ml  IV  





  PRN PRN  





  LINE FLUSH  














Nutrition/Malnutrition Assess





- Dietary Evaluation


Nutrition/Malnutrition Findings: 


                                        





Nutrition Notes                                            Start:  19 

15:14


Freq:                                                      Status: Active       




Protocol:                                                                       




 Document     19 13:52  CARLOS  (Rec: 19 13:57  CARLOS  SRW-

[email or web address])


 Nutrition Notes


     Initial or Follow up                        Reassessment


     Other Pertinent Diagnosis                   Acute on chronic resp failure,


                                                 metastatic breast CA


     Current Diet                                Regular


     Labs/Tests                                  Reviewed


     Pertinent Medications                       Reviewed


     Height                                      5 ft 6 in


     Weight                                      72.4 kg


     Ideal Body Weight (kg)                      59.09


     BMI                                         25.7


     Subjective/Other Information                Pt BiPap at time of visit this


                                                 am.  Pt agrees that it is


                                                 easier to drink than chew food


                                                 at this time.  Her son has


                                                 been feeding her smoothies


                                                 made from whole foods; says pt


                                                 is on a "holistic" diet and


                                                 cannot eat the foods served in


                                                 [facility].


     Burn                                        Absent


     Trauma                                      Absent


     #1


      Nutrition Diagnosis                        Malnutrition


      Diagnosis Progress(for reassessment        Continues


       documentation)                            


     Is patient on ventilator?                   No


     Is Patient Ambulatory and/or Out of Bed     No


     REE-([facility]-confined to bed)      [phone]


     Calculation Used for Recommendations        [facility]


     Additional Notes                            Pro needs 1-1.2g/k-87g/


                                                 day


                                                 Fluid needs 1ml/kcal


 Nutrition Intervention


     Change Diet Order:                          Continue current diet order


     Goal #1                                     PO intakes to meet nutrient


                                                 needs as best possible


     Follow-Up By:                               19


     Additional Comments                         F/U: intakes

## 2019-07-24 LAB
BAND NEUTROPHILS # (MANUAL): 0 K/MM3
BUN SERPL-MCNC: 6 MG/DL (ref 7–17)
BUN/CREAT SERPL: 20 %
CALCIUM SERPL-MCNC: 8.6 MG/DL (ref 8.4–10.2)
HCT VFR BLD CALC: 26.7 % (ref 30.3–42.9)
HEMOLYSIS INDEX: 7
HGB BLD-MCNC: 8.8 GM/DL (ref 10.1–14.3)
MCHC RBC AUTO-ENTMCNC: 33 % (ref 30–34)
MCV RBC AUTO: 93 FL (ref 79–97)
MYELOCYTES # (MANUAL): 0 K/MM3
PLATELET # BLD: 140 K/MM3 (ref 140–440)
PROMYELOCYTES # (MANUAL): 0 K/MM3
RBC # BLD AUTO: 2.86 M/MM3 (ref 3.65–5.03)
TARGETS BLD QL SMEAR: (no result)
TOTAL CELLS COUNTED BLD: 100

## 2019-07-24 PROCEDURE — 5A09357 ASSISTANCE WITH RESPIRATORY VENTILATION, LESS THAN 24 CONSECUTIVE HOURS, CONTINUOUS POSITIVE AIRWAY PRESSURE: ICD-10-PCS | Performed by: INTERNAL MEDICINE

## 2019-07-24 RX ADMIN — HYDROMORPHONE HYDROCHLORIDE PRN MG: 1 INJECTION, SOLUTION INTRAMUSCULAR; INTRAVENOUS; SUBCUTANEOUS at 07:55

## 2019-07-24 RX ADMIN — LORAZEPAM PRN MG: 2 INJECTION INTRAMUSCULAR; INTRAVENOUS at 17:32

## 2019-07-24 RX ADMIN — HYDROMORPHONE HYDROCHLORIDE PRN MG: 1 INJECTION, SOLUTION INTRAMUSCULAR; INTRAVENOUS; SUBCUTANEOUS at 21:08

## 2019-07-24 RX ADMIN — LORAZEPAM PRN MG: 2 INJECTION INTRAMUSCULAR; INTRAVENOUS at 11:36

## 2019-07-24 RX ADMIN — HYDROMORPHONE HYDROCHLORIDE PRN MG: 1 INJECTION, SOLUTION INTRAMUSCULAR; INTRAVENOUS; SUBCUTANEOUS at 03:24

## 2019-07-24 RX ADMIN — DEXTROSE AND SODIUM CHLORIDE SCH MLS/HR: 5; .45 INJECTION, SOLUTION INTRAVENOUS at 13:29

## 2019-07-24 RX ADMIN — Medication SCH ML: at 11:36

## 2019-07-24 RX ADMIN — Medication SCH ML: at 22:39

## 2019-07-24 NOTE — PROGRESS NOTE
Assessment and Plan





59-year-old I[place] female with medical history significant for stage IV 

breast cancer, metastasis to the bone and to the spinal cord with spinal 

compression is interested to the emergency department with complaints of 

shortness of breath.  CTA was done in the emergency department and no PE was 

shown.  Patient was recently discharged from [facility] to hospice care.  I 

get the record from [facility] and she was admitted with the same problems. 

patient is full code and family wants everything to be done and don't want 

hospice care instead they want HH at discharge





Acute on chronic hypoxic respiratory failure


- Patient was on home oxygen


- Continue BiPAP


- Pulmonary consult appreciated


- If patient deteriorates will intubate her


Persistent tachycardia


- Continue on Lopressor IV when necessary


Stage IV breast cancer, with metastatsis to the bone and spine, paraplegia


- Supportive care


- Was treated with palliative intent therapy at [facility]


Nutrition


- patient is on D5 half normal saline


- Patient refused NG tube feeding


- We can not take off the BiPAP because patient is desaturating


CODE STATUS full


Disposition; continue inpatient care. 





Poor prognosis, dw family, recommend that she return to hospice








Subjective


Date of service: 07/24/19


Principal diagnosis: Acute resp failure


Interval history: 





59-year-old [place] female with medical history significant for stage IV 

breast cancer, metastasis to the bone and to the spinal cord with spinal 

compression is interested to the emergency department with complaints of 

shortness of breath.  CTA was done in the emergency department and no PE was 

shown.  Patient was recently discharged from [facility] to hospice care.  I 

get the record from [facility] and she was admitted with the same problems. 

patient is full code and family wants everything to be done and don't want 

hospice care instead they want HH at discharge








Objective





- Exam


Narrative Exam: 





On Bipap 





- Constitutional


Vitals: 


                               Vital Signs - 12hr











  07/24/19 07/24/19 07/24/19





  04:13 04:25 07:55


 


Temperature 98.2 F  


 


Pulse Rate 111 H 118 H 


 


Respiratory 24 26 H 20





Rate   


 


Blood Pressure 126/85  


 


O2 Sat by Pulse 98 92 





Oximetry   














  07/24/19 07/24/19 07/24/19





  08:00 08:39 13:06


 


Temperature  98.3 F 97.7 F


 


Pulse Rate 110 H 96 H 119 H


 


Respiratory 20 20 20





Rate   


 


Blood Pressure  100/64 116/80


 


O2 Sat by Pulse 92 96 99





Oximetry   














  07/24/19 07/24/19





  13:38 15:31


 


Temperature  


 


Pulse Rate  103 H


 


Respiratory  26 H





Rate  


 


Blood Pressure  


 


O2 Sat by Pulse 97 97





Oximetry  











General appearance: Present: mild distress, well-nourished





- EENT


Eyes: PERRL, EOM intact


ENT: hearing intact, clear oral mucosa


Ears: bilateral: normal





- Neck


Neck: supple, normal ROM





- Respiratory


Respiratory effort: normal


Respiratory: bilateral: CTA





- Breasts


Breasts: normal





- Cardiovascular


Heart rate: 78


Rhythm: regular


Heart Sounds: Present: S1 & S2.  Absent: gallop, rub


Extremities: pulses intact, No edema, normal color, Full ROM





- Gastrointestinal


General gastrointestinal: Present: soft, non-tender, non-distended, normal bowel

sounds





- Genitourinary


Female genitourinary: normal





- Integumentary


Integumentary: clear, warm, dry





- Musculoskeletal


Musculoskeletal: 1, strength equal bilaterally





- Neurologic


Neurologic: moves all extremities





- Psychiatric


Psychiatric: memory intact, appropriate mood/affect, intact judgment & insight





- Labs


CBC & Chem 7: 


                                 07/24/19 04:20





                                 07/24/19 04:20


Labs: 


                              Abnormal lab results











  07/24/19 07/24/19 Range/Units





  04:20 04:20 


 


RBC  2.86 L   (3.65-5.03)  M/mm3


 


Hgb  8.8 L   (10.1-14.3)  gm/dl


 


Hct  26.7 L   (30.3-42.9)  %


 


RDW  23.5 H   (13.2-15.2)  %


 


Seg Neuts % (Manual)  83.0 H   (40.0-70.0)  %


 


Lymphocytes % (Manual)  7.0 L   (13.4-35.0)  %


 


Monocytes % (Manual)  8.0 H   (0.0-7.3)  %


 


Nucleated RBC %  14.0 H   (0.0-0.9)  %


 


Seg Neutrophils # Man  8.2 H   (1.8-7.7)  K/mm3


 


Lymphocytes # (Manual)  0.7 L   (1.2-5.4)  K/mm3


 


Sodium   134 L  (137-145)  mmol/L


 


BUN   6 L  (7-17)  mg/dL


 


Creatinine   0.3 L  (0.7-1.2)  mg/dL


 


Glucose   106 H  ()  mg/dL

## 2019-07-25 PROCEDURE — 5A09357 ASSISTANCE WITH RESPIRATORY VENTILATION, LESS THAN 24 CONSECUTIVE HOURS, CONTINUOUS POSITIVE AIRWAY PRESSURE: ICD-10-PCS | Performed by: INTERNAL MEDICINE

## 2019-07-25 RX ADMIN — DEXTROSE AND SODIUM CHLORIDE SCH MLS/HR: 5; .45 INJECTION, SOLUTION INTRAVENOUS at 02:43

## 2019-07-25 RX ADMIN — LORAZEPAM PRN MG: 2 INJECTION INTRAMUSCULAR; INTRAVENOUS at 04:30

## 2019-07-25 RX ADMIN — HYDROMORPHONE HYDROCHLORIDE PRN MG: 1 INJECTION, SOLUTION INTRAMUSCULAR; INTRAVENOUS; SUBCUTANEOUS at 17:48

## 2019-07-25 RX ADMIN — Medication SCH ML: at 22:20

## 2019-07-25 RX ADMIN — LORAZEPAM PRN MG: 2 INJECTION INTRAMUSCULAR; INTRAVENOUS at 12:24

## 2019-07-25 RX ADMIN — HYDROMORPHONE HYDROCHLORIDE PRN MG: 1 INJECTION, SOLUTION INTRAMUSCULAR; INTRAVENOUS; SUBCUTANEOUS at 12:21

## 2019-07-25 RX ADMIN — DEXTROSE AND SODIUM CHLORIDE SCH MLS/HR: 5; .45 INJECTION, SOLUTION INTRAVENOUS at 16:31

## 2019-07-25 RX ADMIN — Medication SCH ML: at 09:43

## 2019-07-25 RX ADMIN — LORAZEPAM PRN MG: 2 INJECTION INTRAMUSCULAR; INTRAVENOUS at 17:49

## 2019-07-25 NOTE — PROGRESS NOTE
Assessment and Plan





59-year-old Ben[place] female with medical history significant for stage IV 

breast cancer, metastasis to the bone and to the spinal cord with spinal 

compression is interested to the emergency department with complaints of 

shortness of breath.  CTA was done in the emergency department and no PE was 

shown.  Patient was recently discharged from [facility] to hospice care.  I 

get the record from [facility] and she was admitted with the same problems. 

patient is full code and family wants everything to be done and don't want 

hospice care instead they want HH at discharge





Acute on chronic hypoxic respiratory failure


- Patient was on home oxygen


- Continue BiPAP


- Pulmonary consult appreciated


- If patient deteriorates will intubate her


Persistent tachycardia


- Continue on Lopressor IV when necessary


Stage IV breast cancer, with metastatsis to the bone and spine, paraplegia


- Supportive care


- Was treated with palliative intent therapy at [place]


Nutrition


- patient is on D5 half normal saline


- Patient refused NG tube feeding


- We can not take off the BiPAP because patient is desaturating


CODE STATUS full


Disposition; continue inpatient care. 





Poor prognosis, dw family, recommend that she return to hospice








Subjective


Date of service: 07/25/19


Principal diagnosis: Acute resp failure


Interval history: 





59-year-old [place] female with medical history significant for stage IV 

breast cancer, metastasis to the bone and to the spinal cord with spinal 

compression is interested to the emergency department with complaints of 

shortness of breath.  CTA was done in the emergency department and no PE was 

shown.  Patient was recently discharged from [facility] to hospice care.  I 

get the record from [facility] and she was admitted with the same problems. 

patient is full code and family wants everything to be done and don't want 

hospice care instead they want HH at discharge








Objective





- Constitutional


Vitals: 


                               Vital Signs - 12hr











  07/25/19 07/25/19 07/25/19





  07:29 07:33 07:38


 


Temperature 98.0 F  


 


Pulse Rate  109 H 109 H


 


Pulse Rate [   





Apical]   


 


Pulse Rate [   





Left Posterior   





Tibial]   


 


Pulse Rate [   





Left Radial]   


 


Pulse Rate [   





Right Posterior   





Tibial]   


 


Pulse Rate [   





Right Radial]   


 


Respiratory 18 26 H 26 H





Rate   


 


Blood Pressure 105/81  


 


O2 Sat by Pulse  100 100





Oximetry   














  07/25/19 07/25/19 07/25/19





  08:47 10:00 12:00


 


Temperature   


 


Pulse Rate 111 H 108 H 100 H


 


Pulse Rate [  113 H 





Apical]   


 


Pulse Rate [  113 H 





Left Posterior   





Tibial]   


 


Pulse Rate [  113 H 





Left Radial]   


 


Pulse Rate [  113 H 





Right Posterior   





Tibial]   


 


Pulse Rate [  113 H 





Right Radial]   


 


Respiratory  24 





Rate   


 


Blood Pressure 95/60  


 


O2 Sat by Pulse 96 98 





Oximetry   














  07/25/19 07/25/19





  13:05 17:07


 


Temperature  


 


Pulse Rate 105 H 102 H


 


Pulse Rate [  





Apical]  


 


Pulse Rate [  





Left Posterior  





Tibial]  


 


Pulse Rate [  





Left Radial]  


 


Pulse Rate [  





Right Posterior  





Tibial]  


 


Pulse Rate [  





Right Radial]  


 


Respiratory 16 16





Rate  


 


Blood Pressure  


 


O2 Sat by Pulse 100 99





Oximetry  














- Labs


CBC & Chem 7: 


                                 07/24/19 04:20





                                 07/24/19 04:20

## 2019-07-26 PROCEDURE — 5A09357 ASSISTANCE WITH RESPIRATORY VENTILATION, LESS THAN 24 CONSECUTIVE HOURS, CONTINUOUS POSITIVE AIRWAY PRESSURE: ICD-10-PCS | Performed by: INTERNAL MEDICINE

## 2019-07-26 RX ADMIN — DEXTROSE AND SODIUM CHLORIDE SCH MLS/HR: 5; .45 INJECTION, SOLUTION INTRAVENOUS at 18:43

## 2019-07-26 RX ADMIN — Medication SCH ML: at 10:19

## 2019-07-26 RX ADMIN — Medication SCH ML: at 22:50

## 2019-07-26 RX ADMIN — LORAZEPAM PRN MG: 2 INJECTION INTRAMUSCULAR; INTRAVENOUS at 05:58

## 2019-07-26 RX ADMIN — METOPROLOL TARTRATE PRN MG: 5 INJECTION INTRAVENOUS at 13:51

## 2019-07-26 RX ADMIN — DEXTROSE AND SODIUM CHLORIDE SCH MLS/HR: 5; .45 INJECTION, SOLUTION INTRAVENOUS at 05:47

## 2019-07-26 RX ADMIN — LORAZEPAM PRN MG: 2 INJECTION INTRAMUSCULAR; INTRAVENOUS at 22:54

## 2019-07-26 RX ADMIN — LORAZEPAM PRN MG: 2 INJECTION INTRAMUSCULAR; INTRAVENOUS at 13:13

## 2019-07-26 NOTE — PROGRESS NOTE
Assessment and Plan





59-year-old [place] female with medical history significant for stage IV 

breast cancer, metastasis to the bone and to the spinal cord with spinal 

compression is interested to the emergency department with complaints of 

shortness of breath.  CTA was done in the emergency department and no PE was 

shown.  Patient was recently discharged from [facility] to hospice care.  I 

get the record from [facility] and she was admitted with the same problems. 

patient is full code and family wants everything to be done and don't want 

hospice care instead they want HH at discharge





Acute on chronic hypoxic respiratory failure


- Patient was on home oxygen


- Continue BiPAP


- Pulmonary consult appreciated


- If patient deteriorates will intubate her


Persistent tachycardia


- Continue on Lopressor IV when necessary


Stage IV breast cancer, with metastatsis to the bone and spine, paraplegia


- Supportive care


- Was treated with palliative intent therapy at [place]


Nutrition


- patient is on D5 half normal saline


- Patient refused NG tube feeding


- We can not take off the BiPAP because patient is desaturating


CODE STATUS full


Disposition; continue inpatient care. 





Poor prognosis, dw family, recommend that she return to hospice








Subjective


Date of service: 07/26/19


Principal diagnosis: Acute resp failure


Interval history: 





59-year-old [place] female with medical history significant for stage IV 

breast cancer, metastasis to the bone and to the spinal cord with spinal 

compression is interested to the emergency department with complaints of 

shortness of breath.  CTA was done in the emergency department and no PE was 

shown.  Patient was recently discharged from [facility] to hospice care.  I 

get the record from [facility] and she was admitted with the same problems. 

patient is full code and family wants everything to be done and don't want 

hospice care instead they want HH at discharge








Objective





- Constitutional


Vitals: 


                               Vital Signs - 12hr











  07/26/19 07/26/19 07/26/19





  08:54 09:07 10:00


 


Temperature 97.4 F L  


 


Pulse Rate 125 H  129 H


 


Pulse Rate [   115 H





Apical]   


 


Pulse Rate [   115 H





Left Posterior   





Tibial]   


 


Pulse Rate [   115 H





Left Radial]   


 


Pulse Rate [   115 H





Right Posterior   





Tibial]   


 


Pulse Rate [   115 H





Right Radial]   


 


Respiratory 44 H  27 H





Rate   


 


Blood Pressure 186/96 108/69 


 


O2 Sat by Pulse 74 L  94





Oximetry   














  07/26/19 07/26/19 07/26/19





  12:49 13:51 13:53


 


Temperature   


 


Pulse Rate 115 H 126 H 98 H


 


Pulse Rate [   





Apical]   


 


Pulse Rate [   





Left Posterior   





Tibial]   


 


Pulse Rate [   





Left Radial]   


 


Pulse Rate [   





Right Posterior   





Tibial]   


 


Pulse Rate [   





Right Radial]   


 


Respiratory   20





Rate   


 


Blood Pressure 151/85 151/85 


 


O2 Sat by Pulse 96  91





Oximetry   














  07/26/19





  17:09


 


Temperature 


 


Pulse Rate 96 H


 


Pulse Rate [ 





Apical] 


 


Pulse Rate [ 





Left Posterior 





Tibial] 


 


Pulse Rate [ 





Left Radial] 


 


Pulse Rate [ 





Right Posterior 





Tibial] 


 


Pulse Rate [ 





Right Radial] 


 


Respiratory 27 H





Rate 


 


Blood Pressure 


 


O2 Sat by Pulse 98





Oximetry 











General appearance: Present: no acute distress, well-nourished





- EENT


Eyes: PERRL, EOM intact


ENT: hearing intact, clear oral mucosa


Ears: bilateral: normal





- Neck


Neck: supple, normal ROM





- Respiratory


Respiratory effort: normal


Respiratory: bilateral: CTA





- Breasts


Breasts: normal





- Cardiovascular


Rhythm: regular


Heart Sounds: Present: S1 & S2.  Absent: gallop, rub


Extremities: pulses intact, No edema, normal color, Full ROM





- Gastrointestinal


General gastrointestinal: Present: soft, non-tender, non-distended, normal bowel

sounds





- Genitourinary


Female genitourinary: normal





- Integumentary


Integumentary: clear, warm, dry





- Musculoskeletal


Musculoskeletal: 1, strength equal bilaterally





- Neurologic


Neurologic: moves all extremities





- Psychiatric


Psychiatric: memory intact, appropriate mood/affect, intact judgment & insight





- Labs


CBC & Chem 7: 


                                 07/24/19 04:20





                                 07/24/19 04:20

## 2019-07-27 PROCEDURE — 5A09357 ASSISTANCE WITH RESPIRATORY VENTILATION, LESS THAN 24 CONSECUTIVE HOURS, CONTINUOUS POSITIVE AIRWAY PRESSURE: ICD-10-PCS | Performed by: INTERNAL MEDICINE

## 2019-07-27 RX ADMIN — Medication SCH ML: at 22:54

## 2019-07-27 RX ADMIN — Medication SCH ML: at 10:37

## 2019-07-27 RX ADMIN — LORAZEPAM PRN MG: 2 INJECTION INTRAMUSCULAR; INTRAVENOUS at 15:49

## 2019-07-27 NOTE — PROGRESS NOTE
Assessment and Plan





Assesment and plan


Acute on chronic hypoxic respiratory failure


- Patient was on home oxygen


- Continue BiPAP


- Pulmonary consult appreciated


- If patient deteriorates will intubate her


Needs to be in Hospice


Persistent tachycardia


- Continue on Lopressor IV when necessary


Stage IV breast cancer, with metastatsis to the bone and spine, paraplegia


- Supportive care


- Was treated with palliative intent therapy at [place]


Nutrition


- patient is on D5 half normal saline


- Patient refused NG tube feeding


- We can not take off the BiPAP because patient is desaturating


CODE STATUS full


Disposition; continue inpatient care. 





Poor prognosis, dw family, recommend that she return to hospice








Subjective


Date of service: 07/27/19


Principal diagnosis: Acute resp failure


Interval history: 





59-year-old [place] female with medical history significant for stage IV 

breast cancer, metastasis to the bone and to the spinal cord with spinal 

compression is interested to the emergency department with complaints of 

shortness of breath.  CTA was done in the emergency department and no PE was 

shown.  Patient was recently discharged from [facility] to hospice care.  I 

get the record from [facility] and she was admitted with the same problems. 

patient is full code and family wants everything to be done and don't want 

hospice care instead they want HH at discharge








Objective





- Exam


Narrative Exam: 





On Bipap 





- Constitutional


Vitals: 


                               Vital Signs - 12hr











  07/27/19 07/27/19 07/27/19





  04:41 07:40 07:53


 


Temperature   98.3 F


 


Pulse Rate  116 H 106 H


 


Pulse Rate [   





Apical]   


 


Pulse Rate [   





Left Posterior   





Tibial]   


 


Pulse Rate [   





Left Radial]   


 


Pulse Rate [   





Right Posterior   





Tibial]   


 


Pulse Rate [   





Right Radial]   


 


Respiratory  23 18





Rate   


 


Blood Pressure   88/58


 


Blood Pressure   





[Left]   


 


O2 Sat by Pulse 91 97 99





Oximetry   














  07/27/19 07/27/19 07/27/19





  10:00 12:00 12:33


 


Temperature  97.8 F 


 


Pulse Rate 116 H 108 H 113 H


 


Pulse Rate [ 116 H  





Apical]   


 


Pulse Rate [ 116 H  





Left Posterior   





Tibial]   


 


Pulse Rate [ 116 H  





Left Radial]   


 


Pulse Rate [ 116 H  





Right Posterior   





Tibial]   


 


Pulse Rate [ 116 H  





Right Radial]   


 


Respiratory 27 H 25 H 21





Rate   


 


Blood Pressure   


 


Blood Pressure  100/60 





[Left]   


 


O2 Sat by Pulse 96 94 100





Oximetry   











General appearance: Present: mild distress, well-nourished





- EENT


Eyes: PERRL, EOM intact


ENT: hearing intact, clear oral mucosa


Ears: bilateral: normal





- Neck


Neck: supple, normal ROM





- Respiratory


Respiratory effort: normal


Respiratory: bilateral: diminished, rhonchi, wheezing





- Breasts


Breasts: normal





- Cardiovascular


Heart rate: 98


Rhythm: regular


Heart Sounds: Present: S1 & S2.  Absent: gallop, rub


Extremities: no ischemia, pulses intact, No edema, normal color, Full ROM





- Gastrointestinal


General gastrointestinal: Present: soft, non-tender, non-distended, normal bowel

 sounds





- Genitourinary


Female genitourinary: normal





- Integumentary


Integumentary: clear, warm, dry





- Musculoskeletal


Musculoskeletal: 1, strength equal bilaterally





- Neurologic


Neurologic: moves all extremities





- Psychiatric


Psychiatric: memory intact, appropriate mood/affect, intact judgment & insight





- Labs


CBC & Chem 7: 


                                 07/24/19 04:20





                                 07/24/19 04:20

## 2019-07-28 PROCEDURE — 5A09357 ASSISTANCE WITH RESPIRATORY VENTILATION, LESS THAN 24 CONSECUTIVE HOURS, CONTINUOUS POSITIVE AIRWAY PRESSURE: ICD-10-PCS | Performed by: INTERNAL MEDICINE

## 2019-07-28 RX ADMIN — Medication SCH ML: at 09:30

## 2019-07-28 RX ADMIN — LORAZEPAM PRN MG: 2 INJECTION INTRAMUSCULAR; INTRAVENOUS at 15:11

## 2019-07-28 RX ADMIN — HYDROMORPHONE HYDROCHLORIDE PRN MG: 1 INJECTION, SOLUTION INTRAMUSCULAR; INTRAVENOUS; SUBCUTANEOUS at 00:49

## 2019-07-28 NOTE — PROGRESS NOTE
Assessment and Plan





Assesment and plan


Acute on chronic hypoxic respiratory failure


- Patient was on home oxygen


- Continue BiPAP


- Pulmonary consult appreciated


- If patient deteriorates will intubate her


Needs to be in Hospice


Persistent tachycardia


- Continue on Lopressor IV when necessary


Stage IV breast cancer, with metastatsis to the bone and spine, paraplegia


- Supportive care


- Was treated with palliative intent therapy at [place]


Nutrition


- patient is on D5 half normal saline


- Patient refused NG tube feeding


- We can not take off the BiPAP because patient is desaturating


CODE STATUS full


Disposition; continue inpatient care. 





Poor prognosis, dw family, recommend that she return to hospice








Subjective


Date of service: 07/28/19


Principal diagnosis: Acute resp failure


Interval history: 





59-year-old [place] female with medical history significant for stage IV 

breast cancer, metastasis to the bone and to the spinal cord with spinal 

compression is interested to the emergency department with complaints of 

shortness of breath.  CTA was done in the emergency department and no PE was 

shown.  Patient was recently discharged from [facility] to hospice care.  I 

get the record from [facility] and she was admitted with the same problems. 

patient is full code and family wants everything to be done and don't want 

hospice care instead they want HH at discharge








Objective





- Exam


Narrative Exam: 





On Bipap 





- Constitutional


Vitals: 


                               Vital Signs - 12hr











  07/27/19 07/27/19 07/27/19





  22:00 23:00 23:54


 


Temperature   98.6 F


 


Pulse Rate 116 H  107 H


 


Respiratory   16





Rate   


 


Blood Pressure   99/56


 


O2 Sat by Pulse 95 96 100





Oximetry   














  07/28/19 07/28/19 07/28/19





  00:00 00:49 03:49


 


Temperature   99.4 F


 


Pulse Rate 110 H  105 H


 


Respiratory 33 H 22 18





Rate   


 


Blood Pressure   100/65


 


O2 Sat by Pulse 96  100





Oximetry   














  07/28/19 07/28/19 07/28/19





  07:31 07:42 07:47


 


Temperature 98.4 F  


 


Pulse Rate 112 H 110 H 


 


Respiratory 18 27 H 





Rate   


 


Blood Pressure 97/63  


 


O2 Sat by Pulse 94 100 100





Oximetry   














  07/28/19





  08:40


 


Temperature 


 


Pulse Rate 


 


Respiratory 20





Rate 


 


Blood Pressure 


 


O2 Sat by Pulse 95





Oximetry 











General appearance: Present: no acute distress, well-nourished





- EENT


Eyes: PERRL, EOM intact


ENT: hearing intact, clear oral mucosa


Ears: bilateral: normal





- Neck


Neck: supple, normal ROM





- Respiratory


Respiratory effort: normal


Respiratory: bilateral: CTA, rhonchi, wheezing





- Breasts


Breasts: normal





- Cardiovascular


Heart rate: 78


Rhythm: regular


Heart Sounds: Present: S1 & S2.  Absent: gallop, rub


Extremities: no ischemia, pulses intact, No edema, normal color, Full ROM





- Gastrointestinal


General gastrointestinal: Present: soft, non-tender, non-distended, normal bowel

sounds





- Genitourinary


Female genitourinary: normal





- Integumentary


Integumentary: clear, warm, dry





- Musculoskeletal


Musculoskeletal: 1, strength equal bilaterally





- Neurologic


Neurologic: moves all extremities





- Psychiatric


Psychiatric: memory intact, appropriate mood/affect, intact judgment & insight





- Labs


CBC & Chem 7: 


                                 07/24/19 04:20





                                 07/24/19 04:20

## 2019-07-29 LAB
ALBUMIN SERPL-MCNC: 2.2 G/DL (ref 3.9–5)
ALT SERPL-CCNC: 25 UNITS/L (ref 7–56)
ANISOCYTOSIS BLD QL SMEAR: (no result)
BAND NEUTROPHILS # (MANUAL): 0 K/MM3
BUN SERPL-MCNC: 10 MG/DL (ref 7–17)
BUN/CREAT SERPL: 33 %
CALCIUM SERPL-MCNC: 8.8 MG/DL (ref 8.4–10.2)
HCT VFR BLD CALC: 22.7 % (ref 30.3–42.9)
HEMOLYSIS INDEX: 0
HGB BLD-MCNC: 7.5 GM/DL (ref 10.1–14.3)
MCHC RBC AUTO-ENTMCNC: 33 % (ref 30–34)
MCV RBC AUTO: 93 FL (ref 79–97)
MYELOCYTES # (MANUAL): 0 K/MM3
PLATELET # BLD: 91 K/MM3 (ref 140–440)
POIKILOCYTOSIS BLD QL SMEAR: (no result)
PROMYELOCYTES # (MANUAL): 0 K/MM3
RBC # BLD AUTO: 2.43 M/MM3 (ref 3.65–5.03)
TOTAL CELLS COUNTED BLD: 100

## 2019-07-29 PROCEDURE — 5A09357 ASSISTANCE WITH RESPIRATORY VENTILATION, LESS THAN 24 CONSECUTIVE HOURS, CONTINUOUS POSITIVE AIRWAY PRESSURE: ICD-10-PCS | Performed by: INTERNAL MEDICINE

## 2019-07-29 RX ADMIN — Medication SCH ML: at 10:32

## 2019-07-29 RX ADMIN — HYDROMORPHONE HYDROCHLORIDE PRN MG: 1 INJECTION, SOLUTION INTRAMUSCULAR; INTRAVENOUS; SUBCUTANEOUS at 00:17

## 2019-07-29 RX ADMIN — LORAZEPAM PRN MG: 2 INJECTION INTRAMUSCULAR; INTRAVENOUS at 05:42

## 2019-07-29 RX ADMIN — LORAZEPAM PRN MG: 2 INJECTION INTRAMUSCULAR; INTRAVENOUS at 12:22

## 2019-07-29 RX ADMIN — Medication SCH ML: at 00:18

## 2019-07-29 RX ADMIN — METOPROLOL TARTRATE PRN MG: 5 INJECTION INTRAVENOUS at 00:15

## 2019-07-29 NOTE — PROGRESS NOTE
Assessment and Plan





59-year-old [place] female with medical history significant for stage IV 

breast cancer, metastasis to the bone and to the spinal cord with spinal 

compression is interested to the emergency department with complaints of 

shortness of breath.  CTA was done in the emergency department and no PE was 

shown.  Patient was recently discharged from [facility] to hospice care.  I 

get the record from [facility] and she was admitted with the same problems. 

patient is full code and family wants everything to be done and don't want 

hospice care instead they want HH at discharge





Acute on chronic hypoxic respiratory failure


- Patient was on home oxygen


- Continue BiPAP


- Pulmonary consult appreciated


- If patient deteriorates will intubate her


Persistent tachycardia


- Continue on Lopressor IV when necessary


Stage IV breast cancer, with metastatsis to the bone and spine, paraplegia


- Supportive care


- Was treated with palliative intent therapy at [place]


Nutrition


- patient is on D5 half normal saline


- Patient refused NG tube feeding


- We can not take off the BiPAP because patient is desaturating


CODE STATUS full


Disposition; continue inpatient care. 





Poor prognosis, dw family, recommend that she return to hospice


nEEDS icu AND THEN ltac


d/W dR [place]








Subjective


Date of service: 07/29/19


Principal diagnosis: Acute resp failure


Interval history: 





59-year-old [place] female with medical history significant for stage IV 

breast cancer, metastasis to the bone and to the spinal cord with spinal 

compression is interested to the emergency department with complaints of 

shortness of breath.  CTA was done in the emergency department and no PE was 

shown.  Patient was recently discharged from [facility] to hospice care.  I 

get the record from [facility] and she was admitted with the same problems. 

patient is full code and family wants everything to be done and don't want 

hospice care instead they want HH at discharge


sTILL ON bIPAP








Objective





- Constitutional


Vitals: 


                               Vital Signs - 12hr











  07/29/19 07/29/19 07/29/19





  04:43 04:45 08:07


 


Temperature  98.6 F 


 


Pulse Rate 119 H  123 H


 


Pulse Rate [   





Apical]   


 


Pulse Rate [   





Left Posterior   





Tibial]   


 


Pulse Rate [   





Left Radial]   


 


Pulse Rate [   





Right Posterior   





Tibial]   


 


Pulse Rate [   





Right Radial]   


 


Respiratory 16  19





Rate   


 


Blood Pressure 106/67  


 


O2 Sat by Pulse 96  96





Oximetry   














  07/29/19 07/29/19 07/29/19





  08:21 10:00 11:00


 


Temperature 97.6 F  


 


Pulse Rate 125 H 129 H 


 


Pulse Rate [  129 H 





Apical]   


 


Pulse Rate [  129 H 





Left Posterior   





Tibial]   


 


Pulse Rate [  129 H 





Left Radial]   


 


Pulse Rate [  129 H 





Right Posterior   





Tibial]   


 


Pulse Rate [  129 H 





Right Radial]   


 


Respiratory 18 16 





Rate   


 


Blood Pressure 109/70  


 


O2 Sat by Pulse 95 94 96





Oximetry   














  07/29/19 07/29/19





  12:13 16:12


 


Temperature  


 


Pulse Rate 117 H 116 H


 


Pulse Rate [  





Apical]  


 


Pulse Rate [  





Left Posterior  





Tibial]  


 


Pulse Rate [  





Left Radial]  


 


Pulse Rate [  





Right Posterior  





Tibial]  


 


Pulse Rate [  





Right Radial]  


 


Respiratory 16 16





Rate  


 


Blood Pressure  


 


O2 Sat by Pulse 97 99





Oximetry  











General appearance: Present: severe distress





- Cardiovascular


Heart rate: 98


Rhythm: regular


Extremities: no ischemia, pulses intact





- Labs


CBC & Chem 7: 


                                 07/29/19 05:22





                                 07/29/19 05:22


Labs: 


                              Abnormal lab results











  07/29/19 07/29/19 Range/Units





  05:22 05:22 


 


RBC  2.43 L   (3.65-5.03)  M/mm3


 


Hgb  7.5 L   (10.1-14.3)  gm/dl


 


Hct  22.7 L   (30.3-42.9)  %


 


RDW  24.3 H   (13.2-15.2)  %


 


Plt Count  91 L   (140-440)  K/mm3


 


Seg Neuts % (Manual)  87.0 H   (40.0-70.0)  %


 


Lymphocytes % (Manual)  3.0 L   (13.4-35.0)  %


 


Nucleated RBC %  29.0 H   (0.0-0.9)  %


 


Seg Neutrophils # Man  0.0 L   (1.8-7.7)  K/mm3


 


Lymphocytes # (Manual)  0.0 L   (1.2-5.4)  K/mm3


 


Creatinine   0.3 L  (0.7-1.2)  mg/dL


 


Total Bilirubin   1.70 H  (0.1-1.2)  mg/dL


 


AST   72 H  (5-40)  units/L


 


Alkaline Phosphatase   138 H  ()  units/L


 


Total Protein   4.9 L  (6.3-8.2)  g/dL


 


Albumin   2.2 L  (3.9-5)  g/dL

## 2019-07-30 PROCEDURE — 5A09357 ASSISTANCE WITH RESPIRATORY VENTILATION, LESS THAN 24 CONSECUTIVE HOURS, CONTINUOUS POSITIVE AIRWAY PRESSURE: ICD-10-PCS | Performed by: INTERNAL MEDICINE

## 2019-07-30 RX ADMIN — HYDROMORPHONE HYDROCHLORIDE PRN MG: 1 INJECTION, SOLUTION INTRAMUSCULAR; INTRAVENOUS; SUBCUTANEOUS at 01:35

## 2019-07-30 RX ADMIN — METOPROLOL TARTRATE PRN MG: 5 INJECTION INTRAVENOUS at 13:52

## 2019-07-30 RX ADMIN — DEXTROSE AND SODIUM CHLORIDE SCH MLS/HR: 5; .9 INJECTION, SOLUTION INTRAVENOUS at 17:12

## 2019-07-30 RX ADMIN — METOPROLOL TARTRATE PRN MG: 5 INJECTION INTRAVENOUS at 23:35

## 2019-07-30 RX ADMIN — LORAZEPAM PRN MG: 2 INJECTION INTRAMUSCULAR; INTRAVENOUS at 11:41

## 2019-07-30 RX ADMIN — Medication SCH ML: at 11:43

## 2019-07-30 RX ADMIN — Medication SCH ML: at 01:36

## 2019-07-30 NOTE — PROGRESS NOTE
Assessment and Plan


Assessment and plan: 





Acute on chronic hypoxic respiratory failure


-Patient was on home oxygen


-Continue BiPAP and taper off as tolerated


-Poor prognosis





Persistent sinus tachycardia


-Continue Lopressor IV when necessary





Stage IV breast cancer, with metastasis to the bone and spine


-Status post lumpectomy


-Cont supportive care. Pt was treated with palliative therapy at [facility] 





Chest pain/Dyspnea


-CTA chest negative for acute PE





Mild hyponatremia


-Improved





Abnormal LFT


-Probably secondary to metastasis





Anemia of chronic disease


-We'll monitor his H&H





Paraplegia


-Continue supportive care





Nutrition


-cont IV D5 normal saline


-Patient refused NG tube placement. However, she can't be taken off BiPAP for 

oral diet because she's desaturating








CODE STATUS: full





Disposition: Poor prognosis, hospice recommended.  Plan is to continue 

discussion with family about discharge planning





History


Interval history: 





Patient seen today.  She is unable to communicate appropriately.  No significant

change or new issues overnight.





Hospitalist Physical





- Constitutional


Vitals: 


                                        











Temp Pulse Resp BP Pulse Ox


 


 98.0 F   127 H  18   113/76   98 


 


 19 11:12  19 11:12  19 11:12  19 11:12  19 11:12











General appearance: Present: no acute distress





- EENT


Eyes: Present: PERRL, EOM intact


ENT: clear oral mucosa





- Neck


Neck: Present: supple





- Respiratory


Respiratory effort: normal


Respiratory: bilateral: diminished





- Cardiovascular


Rhythm: regular (with tachycardia)


Heart Sounds: Present: S1 & S2





- Extremities


Extremity abnormal: edema (in BLE)





- Abdominal


General gastrointestinal: soft, non-tender, normal bowel sounds





- Integumentary


Integumentary: Present: warm, dry





- Neurologic


Neurologic: other (paraplegic)





Results





- Labs


CBC & Chem 7: 


                                 19 05:22





                                 19 05:22


Labs: 


                             Laboratory Last Values











WBC  10.6 K/mm3 (4.5-11.0)   19  05:22    


 


RBC  2.43 M/mm3 (3.65-5.03)  L  19  05:22    


 


Hgb  7.5 gm/dl (10.1-14.3)  L  19  05:22    


 


Hct  22.7 % (30.3-42.9)  L  19  05:22    


 


MCV  93 fl (79-97)   19  05:22    


 


MCH  31 pg (28-32)   19  05:22    


 


MCHC  33 % (30-34)   19  05:22    


 


RDW  24.3 % (13.2-15.2)  H  19  05:22    


 


Plt Count  91 K/mm3 (140-440)  L  19  05:22    


 


Lymph % (Auto)  Np   19  04:43    


 


Mono % (Auto)  Np   19  04:43    


 


Eos % (Auto)  Np   19  04:43    


 


Baso % (Auto)  Np   19  04:43    


 


Lymph #  Np   19  04:43    


 


Mono #  Np   19  04:43    


 


Eos #  Np   19  04:43    


 


Baso #  Np   19  04:43    


 


Add Manual Diff  Complete   19  05:22    


 


Total Counted  100   19  05:22    


 


Seg Neutrophils %  Np   19  04:43    


 


Seg Neuts % (Manual)  87.0 % (40.0-70.0)  H  19  05:22    


 


  1.0 %  19  05:22    


 


  3.0 % (13.4-35.0)  L  19  05:22    


 


Reactive Lymphs % (Man)  0 %  19  05:22    


 


  5.0 % (0.0-7.3)   19  05:22    


 


  0 % (0.0-4.3)   19  05:22    


 


  0 % (0.0-1.8)   19  05:22    


 


  4.0 %  19  05:22    


 


  0 %  19  05:22    


 


  0 %  19  05:22    


 


  0 %  19  05:22    


 


Nucleated RBC %  29.0 % (0.0-0.9)  H  19  05:22    


 


Seg Neutrophils #  Np   19  04:43    


 


Seg Neutrophils # Man  0.0 K/mm3 (1.8-7.7)  L  19  05:22    


 


Band Neutrophils #  0.0 K/mm3  19  05:22    


 


  0.0 K/mm3 (1.2-5.4)  L  19  05:22    


 


Abs React Lymphs (Man)  0.0 K/mm3  19  05:22    


 


  0.0 K/mm3 (0.0-0.8)   19  05:22    


 


  0.0 K/mm3 (0.0-0.4)   19  05:22    


 


  0.0 K/mm3 (0.0-0.1)   19  05:22    


 


  0.0 K/mm3  19  05:22    


 


  0.0 K/mm3  19  05:22    


 


  0.0 K/mm3  19  05:22    


 


Blast Cells #  0.0 K/mm3  19  05:22    


 


WBC Morphology  Not Reportable   19  05:22    


 


Hypersegmented Neuts  Not Reportable   19  05:22    


 


Hyposegmented Neuts  Not Reportable   19  05:22    


 


Hypogranular Neuts  Not Reportable   19  05:22    


 


  Not Reportable   19  05:22    


 


  Not Reportable   19  05:22    


 


  Not Reportable   19  05:22    


 


  Not Reportable   19  05:22    


 


  Not Reportable   19  05:22    


 


  Not Reportable   19  05:22    


 


  Consistent w auto   19  05:22    


 


  Not Reportable   19  05:22    


 


Plt Clumps, EDTA  Not Reportable   19  05:22    


 


  Not Reportable   19  05:22    


 


  Not Reportable   19  05:22    


 


  Not Reportable   19  05:22    


 


Plt Morphology Comment  Not Reportable   19  05:22    


 


RBC Morphology  Not Reportable   19  05:22    


 


Dimorphic RBCs  Not Reportable   19  05:22    


 


  Not Reportable   19  05:22    


 


  Not Reportable   19  05:22    


 


  1+   19  05:22    


 


  2+   19  05:22    


 


  Not Reportable   19  05:22    


 


  Not Reportable   19  05:22    


 


  Not Reportable   19  05:22    


 


  Not Reportable   19  05:22    


 


  Not Reportable   19  05:22    


 


  Not Reportable   19  05:22    


 


  Few   19  05:22    


 


  Not Reportable   19  05:22    


 


  Not Reportable   19  05:22    


 


  Not Reportable   19  05:22    


 


  Not Reportable   19  05:22    


 


  Not Reportable   19  05:22    


 


  Not Reportable   19  05:22    


 


  Not Reportable   19  05:22    


 


  Few   19  05:22    


 


Acanthocytes (Spur)  Not Reportable   19  05:22    


 


Rouleaux  Not Reportable   19  05:22    


 


  Not Reportable   19  05:22    


 


  Not Reportable   19  05:22    


 


  Not Reportable   19  05:22    


 


  Not Reportable   19  05:22    


 


Hem Pathologist Commnt  No   19  05:22    


 


PT  15.1 Sec. (12.2-14.9)  H  19  18:00    


 


INR  1.22  (0.87-1.13)  H  19  18:00    


 


APTT  27.2 Sec. (24.2-36.6)   19  18:00    


 


  6342.18 ng/mlDDU (0-234)  H  19  18:00    


 


POC ABG pH  7.449  (7.35-7.45)   19  09:49    


 


POC ABG pCO2  39.9  (35-45)   19  09:49    


 


POC ABG pO2  72  ()  L  19  09:49    


 


POC ABG HCO3  27.6  (22-26 mml/L)   19  09:49    


 


POC ABG Total CO2  29  (23-27mmol/L)   19  09:49    


 


POC ABG O2 Sat  95   19  09:49    


 


POC ABG Base Excess  4  ((-2) - (+3)mmol/L)   19  09:49    


 


  35 %  19  09:49    


 


Sodium  137 mmol/L (137-145)   19  05:22    


 


Potassium  4.7 mmol/L (3.6-5.0)   19  05:22    


 


Chloride  104.0 mmol/L ()   19  05:22    


 


Carbon Dioxide  22 mmol/L (22-30)   19  05:22    


 


  16 mmol/L  19  05:22    


 


BUN  10 mg/dL (7-17)   19  05:22    


 


  0.3 mg/dL (0.7-1.2)  L  19  05:22    


 


Estimated GFR  > 60 ml/min  19  05:22    


 


  33 %  19  05:22    


 


Glucose  75 mg/dL ()   19  05:22    


 


POC Glucose  134  ()  H  19  16:41    


 


Calcium  8.8 mg/dL (8.4-10.2)   19  05:22    


 


  1.70 mg/dL (0.1-1.2)  H  19  05:22    


 


AST  72 units/L (5-40)  H  19  05:22    


 


ALT  25 units/L (7-56)   19  05:22    


 


  138 units/L ()  H  19  05:22    


 


  132 units/L ()   19  18:00    


 


CK-MB (CK-2)  3.8 ng/mL (0.0-4.0)   19  18:00    


 


CK-MB (CK-2) Rel Index  2.8  (0-4)   19  18:00    


 


  < 0.010 ng/mL (0.00-0.029)   19  18:00    


 


NT-Pro-B Natriuret Pep  1304 pg/mL (0-900)  H  19  18:00    


 


  4.9 g/dL (6.3-8.2)  L  19  05:22    


 


  2.2 g/dL (3.9-5)  L  19  05:22    


 


  0.8 %  19  05:22    














Active Medications





- Current Medications


Current Medications: 














Generic Name Dose Route Start Last Admin





  Trade Name Freq  PRN Reason Stop Dose Admin


 


Acetaminophen  650 mg  19 23:33 





  Tylenol  PO  





  Q4H PRN  





  Pain MILD(1-3)/Fever >100.5/HA  


 


Acetaminophen  650 mg  19 22:18  19 00:15





  Tylenol  MN   650 mg





  Q4H PRN   Administration





  Pain, Mild (1-3)  


 


Acetaminophen/Hydrocodone Bitart  2 each  19 23:33 





  [place] 5/325  PO  





  Q6H PRN  





  Pain, Moderate (4-6)  


 


Hydromorphone HCl  0.5 mg  19 09:04  19 01:35





  Dilaudid  IV   0.5 mg





  Q3H PRN   Administration





  Pain , Severe (7-10)  


 


Lorazepam  1 mg  19 11:30  19 11:41





  Ativan  IV   1 mg





  Q6H PRN   Administration





  Anxiety  


 


Metoprolol Tartrate  2.5 mg  19 16:33  19 00:15





  Lopressor  IV   2.5 mg





  Q6HR PRN   Administration





  tachycardia  


 


Ondansetron HCl  4 mg  19 23:33 





  Zofran  IV  





  Q8H PRN  





  Nausea And Vomiting  


 


Sodium Chloride  10 ml  19 10:00  19 11:43





  Sodium Chloride Flush Syringe 10 Ml  IV   10 ml





  BID RAMÓN   Administration


 


Sodium Chloride  10 ml  19 23:33 





  Sodium Chloride Flush Syringe 10 Ml  IV  





  PRN PRN  





  LINE FLUSH  














Nutrition/Malnutrition Assess





- Dietary Evaluation


Nutrition/Malnutrition Findings: 


                                        





Nutrition Notes                                            Start:  19 

15:14


Freq:                                                      Status: Active       




Protocol:                                                                       




 Document     19 15:09  RM  (Rec: 19 15:11  RM  [id number])


 Nutrition Notes


     Initial or Follow up                        Reassessment


     Current Diagnosis                           Hypertension,Respiratory


                                                 Failure


     Other Pertinent Diagnosis                   Acute on chronic resp failure,


                                                 metastatic breast CA, Sacral


                                                 PU


     Current Diet                                Regular w/Ensure Enlive BID


     Labs/Tests                                  Reviewed


     Pertinent Medications                       Lasix


     Height                                      5 ft 6 in


     Weight                                      80.6 kg


     Ideal Body Weight (kg)                      59.09


     BMI                                         28.6


     Subjective/Other Information                Per nurse pt is only sipping


                                                 fluids including the Ensure


                                                 Enlive d/t being unable to


                                                 come off BiPAP for more that a


                                                 few seconds. Per nurse MD is


                                                 planning to speak with family


                                                 about starting hospice.


     Burn                                        Absent


     Trauma                                      Absent


     #1


      Nutrition Diagnosis                        Malnutrition


      Diagnosis Progress(for reassessment        Continues


       documentation)                            


     Is patient on ventilator?                   No


     Is Patient Ambulatory and/or Out of Bed     No


     REE-([facility]-confined to bed)      [phone]


     Calculation Used for Recommendations        Jessi Oseguera


     Additional Notes                            Pro needs 1-1.2g/k-87g/


                                                 day


                                                 Fluid needs 1ml/kcal


 Nutrition Intervention


     Change Diet Order:                          Continue current diet order


     Add Supplement/Snack (indicate name/kcal    Ensure Enlive 1 daily


      /protein )                                 


     Provides kCal:                              350


     Provides Protein (gm)                       20


     Goal #1                                     PO intakes to meet nutrient


                                                 needs as best possible


     Follow-Up By:                               19


     Additional Comments                         Follow for POC

## 2019-07-31 PROCEDURE — 5A09357 ASSISTANCE WITH RESPIRATORY VENTILATION, LESS THAN 24 CONSECUTIVE HOURS, CONTINUOUS POSITIVE AIRWAY PRESSURE: ICD-10-PCS | Performed by: INTERNAL MEDICINE

## 2019-07-31 RX ADMIN — DEXTROSE AND SODIUM CHLORIDE SCH MLS/HR: 5; .9 INJECTION, SOLUTION INTRAVENOUS at 08:37

## 2019-07-31 NOTE — EVENT NOTE
Date: 07/31/19


Patient is a 59-year-old female that I was asked to see for IV access.  IV 

access needed but the nurse was unable to gain access.  Multiple attempts have 

been done by the nurses.  The hospitalist asked me to start a line.  Timeout 

done with nurse and family.  Consent received from family.  Patient had a left 

EJ placed.  Done under sterile conditions.  Patient tolerated procedure well.  

20 Gauge Placed.  Line secured with tape and Tegaderm.  patient is noted.  Good 

blood return noted.

## 2019-08-01 PROCEDURE — 5A09357 ASSISTANCE WITH RESPIRATORY VENTILATION, LESS THAN 24 CONSECUTIVE HOURS, CONTINUOUS POSITIVE AIRWAY PRESSURE: ICD-10-PCS | Performed by: INTERNAL MEDICINE

## 2019-08-01 RX ADMIN — Medication SCH ML: at 13:01

## 2019-08-01 RX ADMIN — LORAZEPAM PRN MG: 2 INJECTION INTRAMUSCULAR; INTRAVENOUS at 04:34

## 2019-08-01 RX ADMIN — Medication SCH: at 12:17

## 2019-08-01 RX ADMIN — Medication SCH: at 12:18

## 2019-08-01 RX ADMIN — Medication SCH: at 02:31

## 2019-08-01 RX ADMIN — Medication SCH: at 23:18

## 2019-08-01 NOTE — PROGRESS NOTE
Assessment and Plan


Assessment and plan: 





Acute on chronic hypoxic respiratory failure


-On continuous BiPAP, unable to taper off


-Poor prognosis





Acute metabolic encephalopathy


-No significant change, will continue to monitor clinically





Persistent sinus tachycardia


-Continue Lopressor IV when necessary





Stage IV breast cancer, with metastasis to the bone and spine


-Status post lumpectomy


-Cont supportive care. Pt was recently treated with palliative therapy at 

[facility] 





Chest pain/Dyspnea


-CTA chest negative for acute PE





Mild hyponatremia


-Improved





Abnormal LFT


-Probably secondary to metastasis





Anemia of chronic disease


-H&H stable





Paraplegia


-Continue supportive care





History of hypertension


-Blood pressure stable





Nutrition


-Patient refused NG tube placement. However, she can't be taken off BiPAP for 

oral diet because she's desaturating








CODE STATUS: full





Disposition: Poor prognosis, hospice recommended. However, despite extensive 

discussion with the family, they stated that they still want her to be full code

and do not want hospice. CM looking for NHF that can accept BIPAP which is very 

challenging.





History


Interval history: 





Patient is nonverbal.  No reported issues overnight per the nursing staff.  IV 

access placed yesterday





Hospitalist Physical





- Constitutional


Vitals: 


                                        











Temp Pulse Resp BP Pulse Ox


 


 98.9 F   117 H  50 H  124/69   98 


 


 08/01/19 07:46  08/01/19 07:46  08/01/19 07:46  08/01/19 07:46  08/01/19 07:46











General appearance: Present: mild distress, other (on BIPAP)





- EENT


Eyes: Present: PERRL, EOM intact


ENT: clear oral mucosa





- Neck


Neck: Present: supple





- Respiratory


Respiratory effort: labored


Respiratory: bilateral: rales





- Cardiovascular


Rhythm: regular (with tachycardia)


Heart Sounds: Present: S1 & S2





- Extremities


Extremity abnormal: edema (in BLE and BUE)





- Abdominal


General gastrointestinal: soft, non-tender, normal bowel sounds





- Integumentary


Integumentary: Present: clear, warm, dry





- Neurologic


Neurologic: other (patient is altered and unable to communicate)





Results





- Labs


CBC & Chem 7: 


                                 07/29/19 05:22





                                 07/29/19 05:22


Labs: 


                             Laboratory Last Values











WBC  10.6 K/mm3 (4.5-11.0)   07/29/19  05:22    


 


RBC  2.43 M/mm3 (3.65-5.03)  L  07/29/19  05:22    


 


Hgb  7.5 gm/dl (10.1-14.3)  L  07/29/19  05:22    


 


Hct  22.7 % (30.3-42.9)  L  07/29/19  05:22    


 


MCV  93 fl (79-97)   07/29/19  05:22    


 


MCH  31 pg (28-32)   07/29/19  05:22    


 


MCHC  33 % (30-34)   07/29/19  05:22    


 


RDW  24.3 % (13.2-15.2)  H  07/29/19  05:22    


 


Plt Count  91 K/mm3 (140-440)  L  07/29/19  05:22    


 


Lymph % (Auto)  Np   07/19/19  04:43    


 


Mono % (Auto)  Np   07/19/19  04:43    


 


Eos % (Auto)  Np   07/19/19  04:43    


 


Baso % (Auto)  Np   07/19/19  04:43    


 


Lymph #  Np   07/19/19  04:43    


 


Mono #  Np   07/19/19  04:43    


 


Eos #  Np   07/19/19  04:43    


 


Baso #  Np   07/19/19  04:43    


 


Add Manual Diff  Complete   07/29/19  05:22    


 


Total Counted  100   07/29/19  05:22    


 


Seg Neutrophils %  Np   07/19/19  04:43    


 


Seg Neuts % (Manual)  87.0 % (40.0-70.0)  H  07/29/19  05:22    


 


  1.0 %  07/29/19  05:22    


 


  3.0 % (13.4-35.0)  L  07/29/19  05:22    


 


Reactive Lymphs % (Man)  0 %  07/29/19  05:22    


 


  5.0 % (0.0-7.3)   07/29/19  05:22    


 


  0 % (0.0-4.3)   07/29/19  05:22    


 


  0 % (0.0-1.8)   07/29/19  05:22    


 


  4.0 %  07/29/19  05:22    


 


  0 %  07/29/19  05:22    


 


  0 %  07/29/19  05:22    


 


  0 %  07/29/19  05:22    


 


Nucleated RBC %  29.0 % (0.0-0.9)  H  07/29/19  05:22    


 


Seg Neutrophils #  Np   07/19/19  04:43    


 


Seg Neutrophils # Man  0.0 K/mm3 (1.8-7.7)  L  07/29/19  05:22    


 


Band Neutrophils #  0.0 K/mm3  07/29/19  05:22    


 


  0.0 K/mm3 (1.2-5.4)  L  07/29/19  05:22    


 


Abs React Lymphs (Man)  0.0 K/mm3  07/29/19  05:22    


 


  0.0 K/mm3 (0.0-0.8)   07/29/19  05:22    


 


  0.0 K/mm3 (0.0-0.4)   07/29/19  05:22    


 


  0.0 K/mm3 (0.0-0.1)   07/29/19  05:22    


 


  0.0 K/mm3  07/29/19  05:22    


 


  0.0 K/mm3  07/29/19  05:22    


 


  0.0 K/mm3  07/29/19  05:22    


 


Blast Cells #  0.0 K/mm3  07/29/19  05:22    


 


WBC Morphology  Not Reportable   07/29/19  05:22    


 


Hypersegmented Neuts  Not Reportable   07/29/19  05:22    


 


Hyposegmented Neuts  Not Reportable   07/29/19  05:22    


 


Hypogranular Neuts  Not Reportable   07/29/19  05:22    


 


  Not Reportable   07/29/19  05:22    


 


  Not Reportable   07/29/19  05:22    


 


  Not Reportable   07/29/19  05:22    


 


  Not Reportable   07/29/19  05:22    


 


  Not Reportable   07/29/19  05:22    


 


  Not Reportable   07/29/19  05:22    


 


  Consistent w auto   07/29/19  05:22    


 


  Not Reportable   07/29/19  05:22    


 


Plt Clumps, EDTA  Not Reportable   07/29/19  05:22    


 


  Not Reportable   07/29/19  05:22    


 


  Not Reportable   07/29/19  05:22    


 


  Not Reportable   07/29/19  05:22    


 


Plt Morphology Comment  Not Reportable   07/29/19  05:22    


 


RBC Morphology  Not Reportable   07/29/19  05:22    


 


Dimorphic RBCs  Not Reportable   07/29/19  05:22    


 


  Not Reportable   07/29/19  05:22    


 


  Not Reportable   07/29/19  05:22    


 


  1+   07/29/19  05:22    


 


  2+   07/29/19  05:22    


 


  Not Reportable   07/29/19  05:22    


 


  Not Reportable   07/29/19  05:22    


 


  Not Reportable   07/29/19  05:22    


 


  Not Reportable   07/29/19  05:22    


 


  Not Reportable   07/29/19  05:22    


 


  Not Reportable   07/29/19  05:22    


 


  Few   07/29/19  05:22    


 


  Not Reportable   07/29/19  05:22    


 


  Not Reportable   07/29/19  05:22    


 


  Not Reportable   07/29/19  05:22    


 


  Not Reportable   07/29/19  05:22    


 


  Not Reportable   07/29/19  05:22    


 


  Not Reportable   07/29/19  05:22    


 


  Not Reportable   07/29/19  05:22    


 


  Few   07/29/19  05:22    


 


Acanthocytes (Spur)  Not Reportable   07/29/19  05:22    


 


Rouleaux  Not Reportable   07/29/19  05:22    


 


  Not Reportable   07/29/19  05:22    


 


  Not Reportable   07/29/19  05:22    


 


  Not Reportable   07/29/19  05:22    


 


  Not Reportable   07/29/19  05:22    


 


Hem Pathologist Commnt  No   07/29/19  05:22    


 


PT  15.1 Sec. (12.2-14.9)  H  07/17/19  18:00    


 


INR  1.22  (0.87-1.13)  H  07/17/19  18:00    


 


APTT  27.2 Sec. (24.2-36.6)   07/17/19  18:00    


 


  6342.18 ng/mlDDU (0-234)  H  07/17/19  18:00    


 


POC ABG pH  7.449  (7.35-7.45)   07/18/19  09:49    


 


POC ABG pCO2  39.9  (35-45)   07/18/19  09:49    


 


POC ABG pO2  72  ()  L  07/18/19  09:49    


 


POC ABG HCO3  27.6  (22-26 mml/L)   07/18/19  09:49    


 


POC ABG Total CO2  29  (23-27mmol/L)   07/18/19  09:49    


 


POC ABG O2 Sat  95   07/18/19  09:49    


 


POC ABG Base Excess  4  ((-2) - (+3)mmol/L)   07/18/19  09:49    


 


  35 %  07/18/19  09:49    


 


Sodium  137 mmol/L (137-145)   07/29/19  05:22    


 


Potassium  4.7 mmol/L (3.6-5.0)   07/29/19  05:22    


 


Chloride  104.0 mmol/L ()   07/29/19  05:22    


 


Carbon Dioxide  22 mmol/L (22-30)   07/29/19  05:22    


 


  16 mmol/L  07/29/19  05:22    


 


BUN  10 mg/dL (7-17)   07/29/19  05:22    


 


  0.3 mg/dL (0.7-1.2)  L  07/29/19  05:22    


 


Estimated GFR  > 60 ml/min  07/29/19  05:22    


 


  33 %  07/29/19  05:22    


 


Glucose  75 mg/dL ()   07/29/19  05:22    


 


POC Glucose  134  ()  H  08/01/19  05:45    


 


Calcium  8.8 mg/dL (8.4-10.2)   07/29/19  05:22    


 


  1.70 mg/dL (0.1-1.2)  H  07/29/19  05:22    


 


AST  72 units/L (5-40)  H  07/29/19  05:22    


 


ALT  25 units/L (7-56)   07/29/19  05:22    


 


  138 units/L ()  H  07/29/19  05:22    


 


  132 units/L ()   07/17/19  18:00    


 


CK-MB (CK-2)  3.8 ng/mL (0.0-4.0)   07/17/19  18:00    


 


CK-MB (CK-2) Rel Index  2.8  (0-4)   07/17/19  18:00    


 


  < 0.010 ng/mL (0.00-0.029)   07/17/19  18:00    


 


NT-Pro-B Natriuret Pep  1304 pg/mL (0-900)  H  07/17/19  18:00    


 


  4.9 g/dL (6.3-8.2)  L  07/29/19  05:22    


 


  2.2 g/dL (3.9-5)  L  07/29/19  05:22    


 


  0.8 %  07/29/19  05:22    














Active Medications





- Current Medications


Current Medications: 














Generic Name Dose Route Start Last Admin





  Trade Name Freq  PRN Reason Stop Dose Admin


 


Acetaminophen  650 mg  07/17/19 23:33 





  Tylenol  PO  





  Q4H PRN  





  Pain MILD(1-3)/Fever >100.5/HA  


 


Acetaminophen  650 mg  07/28/19 22:18  07/29/19 00:15





  Tylenol  IN   650 mg





  Q4H PRN   Administration





  Pain, Mild (1-3)  


 


Acetaminophen/Hydrocodone Bitart  2 each  07/17/19 23:33 





  [place] 5/325  PO  





  Q6H PRN  





  Pain, Moderate (4-6)  


 


Hydromorphone HCl  0.5 mg  07/18/19 09:04  07/30/19 01:35





  Dilaudid  IV   0.5 mg





  Q3H PRN   Administration





  Pain , Severe (7-10)  


 


Dextrose/Sodium Chloride  1,000 mls @ 75 mls/hr  07/30/19 13:00  07/31/19 08:37





  D5ns  IV   75 mls/hr





  AS DIRECT RAMÓN   Administration


 


Lorazepam  1 mg  07/24/19 11:30  08/01/19 04:34





  Ativan  IV   1 mg





  Q6H PRN   Administration





  Anxiety  


 


Metoprolol Tartrate  2.5 mg  07/18/19 16:33  07/30/19 23:35





  Lopressor  IV   2.5 mg





  Q6HR PRN   Administration





  tachycardia  


 


Ondansetron HCl  4 mg  07/17/19 23:33 





  Zofran  IV  





  Q8H PRN  





  Nausea And Vomiting  


 


Sodium Chloride  10 ml  07/18/19 10:00  08/01/19 02:31





  Sodium Chloride Flush Syringe 10 Ml  IV   Not Given





  BID RAMÓN  


 


Sodium Chloride  10 ml  07/17/19 23:33  08/01/19 04:35





  Sodium Chloride Flush Syringe 10 Ml  IV   10 ml





  PRN PRN   Administration





  LINE FLUSH  














Nutrition/Malnutrition Assess





- Dietary Evaluation


Nutrition/Malnutrition Findings: 


                                        





Nutrition Notes                                            Start:  07/18/19 

15:14


Freq:                                                      Status: Active       




Protocol:                                                                       




 Document     07/31/19 15:51  RM  (Rec: 07/31/19 15:53  RM  [id number])


 Nutrition Notes


     Initial or Follow up                        Brief Note


     Subjective/Other Information                Per  w/


                                                 family to discuss hospice is


                                                 planned for 3 pm today.


 Nutrition Intervention


     Follow-Up By:                               08/02/19


     Additional Comments                         Follow for POC

## 2019-08-02 LAB
ANISOCYTOSIS BLD QL SMEAR: (no result)
BAND NEUTROPHILS # (MANUAL): 0 K/MM3
BUN SERPL-MCNC: 6 MG/DL (ref 7–17)
BUN/CREAT SERPL: 30 %
CALCIUM SERPL-MCNC: 8.8 MG/DL (ref 8.4–10.2)
HCT VFR BLD CALC: 24.2 % (ref 30.3–42.9)
HEMOLYSIS INDEX: 11
HGB BLD-MCNC: 8 GM/DL (ref 10.1–14.3)
MACROCYTES BLD QL SMEAR: (no result)
MCHC RBC AUTO-ENTMCNC: 33 % (ref 30–34)
MCV RBC AUTO: 96 FL (ref 79–97)
MYELOCYTES # (MANUAL): 0 K/MM3
PLATELET # BLD: 81 K/MM3 (ref 140–440)
PROMYELOCYTES # (MANUAL): 0 K/MM3
RBC # BLD AUTO: 2.53 M/MM3 (ref 3.65–5.03)
TARGETS BLD QL SMEAR: (no result)
TOTAL CELLS COUNTED BLD: 100

## 2019-08-02 PROCEDURE — 5A09357 ASSISTANCE WITH RESPIRATORY VENTILATION, LESS THAN 24 CONSECUTIVE HOURS, CONTINUOUS POSITIVE AIRWAY PRESSURE: ICD-10-PCS | Performed by: INTERNAL MEDICINE

## 2019-08-02 RX ADMIN — METOPROLOL TARTRATE PRN MG: 5 INJECTION INTRAVENOUS at 09:20

## 2019-08-02 RX ADMIN — DEXTROSE AND SODIUM CHLORIDE SCH MLS/HR: 5; .9 INJECTION, SOLUTION INTRAVENOUS at 17:35

## 2019-08-02 RX ADMIN — DEXTROSE AND SODIUM CHLORIDE SCH MLS/HR: 5; .9 INJECTION, SOLUTION INTRAVENOUS at 03:18

## 2019-08-02 RX ADMIN — METOPROLOL TARTRATE PRN MG: 5 INJECTION INTRAVENOUS at 17:52

## 2019-08-02 RX ADMIN — Medication SCH ML: at 09:22

## 2019-08-02 RX ADMIN — Medication SCH ML: at 22:00

## 2019-08-02 NOTE — PROGRESS NOTE
Assessment and Plan


Assessment and plan: 





Acute on chronic hypoxic respiratory failure


-On continuous BiPAP, unable to taper off


-Poor prognosis





Acute metabolic encephalopathy, not present on admission


-MS slightly improved today, will continue to monitor clinically





Persistent sinus tachycardia


-Continue Lopressor IV when necessary





Bilateral pleural effusion


-Likely malignant





SIRS, present on admission


-Probably due to noninfectious cause


-Chest x-ray on 7/17 suspicious for LLL pneumonia, however CTA chest on same day

negative for pneumonia





Elevated BNP on admission


-Probably due to volume overload


-No reported history of CHF





Stage IV breast cancer, with metastasis to the bone and spine


-Status post lumpectomy


-Cont supportive care. Pt was recently treated with palliative therapy at 

[facility] 





Chest pain/Dyspnea


-Probably due to pathological fracture of left 10th rib


-CTA chest negative for acute PE





Mild hyponatremia


-Improved





Abnormal LFT


-Probably secondary to metastasis





Anemia of chronic disease


-H&H stable





Paraplegia


-Continue supportive care





History of hypertension


-Blood pressure stable





Nutrition


-Patient refused NG tube placement. However, she can't be taken off BiPAP for 

oral diet because she's desaturates to the 60's.  Family providing her with sips

of drink.








CODE STATUS: full





Disposition: Poor prognosis, hospice recommended. However, despite extensive 

discussion with the family, they stated that they still want her to be full code

and do not want hospice. CM looking for NHF that can accept BIPAP which is very 

challenging.





History


Interval history: 





Patient more awake today but more short of breath.  However, still unable to 

communicate appropriately. 





Hospitalist Physical





- Constitutional


Vitals: 


                                        











Temp Pulse Resp BP Pulse Ox


 


 98.2 F   102 H  20   120/74   945 H


 


 08/02/19 08:06  08/02/19 13:27  08/02/19 13:27  08/02/19 09:20  08/02/19 13:27











General appearance: Present: mild distress, other (on BIPAP)





- EENT


Eyes: Present: PERRL, EOM intact


ENT: clear oral mucosa





- Neck


Neck: Present: supple





- Respiratory


Respiratory effort: labored


Respiratory: bilateral: rales





- Cardiovascular


Rhythm: regular (with tachycardia)


Heart Sounds: Present: S1 & S2





- Extremities


Extremity abnormal: edema (in BLE and BUE)





- Abdominal


General gastrointestinal: soft, non-tender, normal bowel sounds





- Integumentary


Integumentary: Present: clear, warm, dry





- Neurologic


Neurologic: moves all extremities





Results





- Labs


CBC & Chem 7: 


                                 08/02/19 10:08





                                 08/02/19 10:08


Labs: 


                             Laboratory Last Values











WBC  10.4 K/mm3 (4.5-11.0)   08/02/19  10:08    


 


RBC  2.53 M/mm3 (3.65-5.03)  L  08/02/19  10:08    


 


Hgb  8.0 gm/dl (10.1-14.3)  L  08/02/19  10:08    


 


Hct  24.2 % (30.3-42.9)  L  08/02/19  10:08    


 


MCV  96 fl (79-97)   08/02/19  10:08    


 


MCH  32 pg (28-32)   08/02/19  10:08    


 


MCHC  33 % (30-34)   08/02/19  10:08    


 


RDW  25.2 % (13.2-15.2)  H  08/02/19  10:08    


 


Plt Count  81 K/mm3 (140-440)  L  08/02/19  10:08    


 


Lymph % (Auto)  Np   07/19/19  04:43    


 


Mono % (Auto)  Np   07/19/19  04:43    


 


Eos % (Auto)  Np   07/19/19  04:43    


 


Baso % (Auto)  Np   07/19/19  04:43    


 


Lymph #  Np   07/19/19  04:43    


 


Mono #  Np   07/19/19  04:43    


 


Eos #  Np   07/19/19  04:43    


 


Baso #  Np   07/19/19  04:43    


 


Add Manual Diff  Complete   08/02/19  10:08    


 


Total Counted  100   08/02/19  10:08    


 


Seg Neutrophils %  Np   07/19/19  04:43    


 


Seg Neuts % (Manual)  91.0 % (40.0-70.0)  H  08/02/19  10:08    


 


  2.0 %  08/02/19  10:08    


 


  2.0 % (13.4-35.0)  L  08/02/19  10:08    


 


Reactive Lymphs % (Man)  0 %  08/02/19  10:08    


 


  1.0 % (0.0-7.3)   08/02/19  10:08    


 


  2.0 % (0.0-4.3)   08/02/19  10:08    


 


  0 % (0.0-1.8)   08/02/19  10:08    


 


  1.0 %  08/02/19  10:08    


 


  1.0 %  08/02/19  10:08    


 


  0 %  08/02/19  10:08    


 


  0 %  08/02/19  10:08    


 


Nucleated RBC %  32.0 % (0.0-0.9)  H  08/02/19  10:08    


 


Seg Neutrophils #  Np   07/19/19  04:43    


 


Seg Neutrophils # Man  0.0 K/mm3 (1.8-7.7)  L  08/02/19  10:08    


 


Band Neutrophils #  0.0 K/mm3  08/02/19  10:08    


 


  0.0 K/mm3 (1.2-5.4)  L  08/02/19  10:08    


 


Abs React Lymphs (Man)  0.0 K/mm3  08/02/19  10:08    


 


  0.0 K/mm3 (0.0-0.8)   08/02/19  10:08    


 


  0.0 K/mm3 (0.0-0.4)   08/02/19  10:08    


 


  0.0 K/mm3 (0.0-0.1)   08/02/19  10:08    


 


  0.0 K/mm3  08/02/19  10:08    


 


  0.0 K/mm3  08/02/19  10:08    


 


  0.0 K/mm3  08/02/19  10:08    


 


Blast Cells #  0.0 K/mm3  08/02/19  10:08    


 


WBC Morphology  Not Reportable   08/02/19  10:08    


 


Hypersegmented Neuts  Not Reportable   08/02/19  10:08    


 


Hyposegmented Neuts  Not Reportable   08/02/19  10:08    


 


Hypogranular Neuts  Not Reportable   08/02/19  10:08    


 


  Not Reportable   08/02/19  10:08    


 


  Not Reportable   08/02/19  10:08    


 


  Not Reportable   08/02/19  10:08    


 


  Not Reportable   08/02/19  10:08    


 


  Not Reportable   08/02/19  10:08    


 


  Not Reportable   08/02/19  10:08    


 


  Consistent w auto   08/02/19  10:08    


 


  Not Reportable   08/02/19  10:08    


 


Plt Clumps, EDTA  Not Reportable   08/02/19  10:08    


 


  Not Reportable   08/02/19  10:08    


 


  Not Reportable   08/02/19  10:08    


 


  Not Reportable   08/02/19  10:08    


 


Plt Morphology Comment  Not Reportable   08/02/19  10:08    


 


RBC Morphology  Not Reportable   08/02/19  10:08    


 


Dimorphic RBCs  Not Reportable   08/02/19  10:08    


 


  2+   08/02/19  10:08    


 


  Not Reportable   08/02/19  10:08    


 


  Not Reportable   08/02/19  10:08    


 


  2+   08/02/19  10:08    


 


  Not Reportable   08/02/19  10:08    


 


  1+   08/02/19  10:08    


 


  Not Reportable   08/02/19  10:08    


 


  Not Reportable   08/02/19  10:08    


 


  Not Reportable   08/02/19  10:08    


 


  2+   08/02/19  10:08    


 


  Not Reportable   08/02/19  10:08    


 


  Not Reportable   08/02/19  10:08    


 


  Not Reportable   08/02/19  10:08    


 


  Not Reportable   08/02/19  10:08    


 


  Not Reportable   08/02/19  10:08    


 


  Not Reportable   08/02/19  10:08    


 


  Not Reportable   08/02/19  10:08    


 


  Not Reportable   08/02/19  10:08    


 


  Not Reportable   08/02/19  10:08    


 


Acanthocytes (Spur)  Not Reportable   08/02/19  10:08    


 


Rouleaux  Not Reportable   08/02/19  10:08    


 


  Not Reportable   08/02/19  10:08    


 


  Not Reportable   08/02/19  10:08    


 


  Not Reportable   08/02/19  10:08    


 


  1+   08/02/19  10:08    


 


Hem Pathologist Commnt  No   08/02/19  10:08    


 


PT  15.1 Sec. (12.2-14.9)  H  07/17/19  18:00    


 


INR  1.22  (0.87-1.13)  H  07/17/19  18:00    


 


APTT  27.2 Sec. (24.2-36.6)   07/17/19  18:00    


 


  6342.18 ng/mlDDU (0-234)  H  07/17/19  18:00    


 


POC ABG pH  7.449  (7.35-7.45)   07/18/19  09:49    


 


POC ABG pCO2  39.9  (35-45)   07/18/19  09:49    


 


POC ABG pO2  72  ()  L  07/18/19  09:49    


 


POC ABG HCO3  27.6  (22-26 mml/L)   07/18/19  09:49    


 


POC ABG Total CO2  29  (23-27mmol/L)   07/18/19  09:49    


 


POC ABG O2 Sat  95   07/18/19  09:49    


 


POC ABG Base Excess  4  ((-2) - (+3)mmol/L)   07/18/19  09:49    


 


  35 %  07/18/19  09:49    


 


Sodium  139 mmol/L (137-145)   08/02/19  10:08    


 


Potassium  4.0 mmol/L (3.6-5.0)   08/02/19  10:08    


 


Chloride  109.3 mmol/L ()  H  08/02/19  10:08    


 


Carbon Dioxide  24 mmol/L (22-30)   08/02/19  10:08    


 


  10 mmol/L  08/02/19  10:08    


 


BUN  6 mg/dL (7-17)  L  08/02/19  10:08    


 


  0.2 mg/dL (0.7-1.2)  L  08/02/19  10:08    


 


Estimated GFR  > 60 ml/min  08/02/19  10:08    


 


  30 %  08/02/19  10:08    


 


Glucose  138 mg/dL ()  H  08/02/19  10:08    


 


POC Glucose  150  ()  H  08/02/19  12:41    


 


Calcium  8.8 mg/dL (8.4-10.2)   08/02/19  10:08    


 


Magnesium  2.30 mg/dL (1.7-2.3)   08/02/19  10:08    


 


  1.70 mg/dL (0.1-1.2)  H  07/29/19  05:22    


 


AST  72 units/L (5-40)  H  07/29/19  05:22    


 


ALT  25 units/L (7-56)   07/29/19  05:22    


 


  138 units/L ()  H  07/29/19  05:22    


 


  132 units/L ()   07/17/19  18:00    


 


CK-MB (CK-2)  3.8 ng/mL (0.0-4.0)   07/17/19  18:00    


 


CK-MB (CK-2) Rel Index  2.8  (0-4)   07/17/19  18:00    


 


  < 0.010 ng/mL (0.00-0.029)   07/17/19  18:00    


 


NT-Pro-B Natriuret Pep  1304 pg/mL (0-900)  H  07/17/19  18:00    


 


  4.9 g/dL (6.3-8.2)  L  07/29/19  05:22    


 


  2.2 g/dL (3.9-5)  L  07/29/19  05:22    


 


  0.8 %  07/29/19  05:22    














Active Medications





- Current Medications


Current Medications: 














Generic Name Dose Route Start Last Admin





  Trade Name Freq  PRN Reason Stop Dose Admin


 


Acetaminophen  650 mg  07/17/19 23:33 





  Tylenol  PO  





  Q4H PRN  





  Pain MILD(1-3)/Fever >100.5/HA  


 


Acetaminophen  650 mg  07/28/19 22:18  07/29/19 00:15





  Tylenol  [place]   650 mg





  Q4H PRN   Administration





  Pain, Mild (1-3)  


 


Acetaminophen/Hydrocodone Bitart  2 each  07/17/19 23:33 





  [place] 5/325  PO  





  Q6H PRN  





  Pain, Moderate (4-6)  


 


Hydromorphone HCl  0.5 mg  07/18/19 09:04  07/30/19 01:35





  Dilaudid  IV   0.5 mg





  Q3H PRN   Administration





  Pain , Severe (7-10)  


 


Dextrose/Sodium Chloride  1,000 mls @ 75 mls/hr  07/30/19 13:00  08/02/19 03:18





  D5ns  IV   75 mls/hr





  AS DIRECT RAMÓN   Administration


 


Lorazepam  1 mg  07/24/19 11:30  08/01/19 04:34





  Ativan  IV   1 mg





  Q6H PRN   Administration





  Anxiety  


 


Metoprolol Tartrate  2.5 mg  07/18/19 16:33  08/02/19 09:20





  Lopressor  IV   2.5 mg





  Q6HR PRN   Administration





  tachycardia  


 


Ondansetron HCl  4 mg  07/17/19 23:33 





  Zofran  IV  





  Q8H PRN  





  Nausea And Vomiting  


 


Sodium Chloride  10 ml  07/18/19 10:00  08/02/19 09:22





  Sodium Chloride Flush Syringe 10 Ml  IV   10 ml





  BID RAMÓN   Administration


 


Sodium Chloride  10 ml  07/17/19 23:33  08/01/19 04:35





  Sodium Chloride Flush Syringe 10 Ml  IV   10 ml





  PRN PRN   Administration





  LINE FLUSH  














Nutrition/Malnutrition Assess





- Dietary Evaluation


Nutrition/Malnutrition Findings: 


                                        





Nutrition Notes                                            Start:  07/18/19 

15:14


Freq:                                                      Status: Active       




Protocol:                                                                       




 Document     07/31/19 15:51  RM  (Rec: 07/31/19 15:53  RM  [id number])


 Nutrition Notes


     Initial or Follow up                        Brief Note


     Subjective/Other Information                Per  w/


                                                 family to discuss hospice is


                                                 planned for 3 pm today.


 Nutrition Intervention


     Follow-Up By:                               08/02/19


     Additional Comments                         Follow for POC

## 2019-08-03 PROCEDURE — 5A09357 ASSISTANCE WITH RESPIRATORY VENTILATION, LESS THAN 24 CONSECUTIVE HOURS, CONTINUOUS POSITIVE AIRWAY PRESSURE: ICD-10-PCS | Performed by: INTERNAL MEDICINE

## 2019-08-03 RX ADMIN — Medication SCH ML: at 10:10

## 2019-08-03 RX ADMIN — Medication SCH ML: at 21:06

## 2019-08-03 RX ADMIN — DEXTROSE AND SODIUM CHLORIDE SCH MLS/HR: 5; .9 INJECTION, SOLUTION INTRAVENOUS at 21:06

## 2019-08-03 RX ADMIN — HYDROMORPHONE HYDROCHLORIDE PRN MG: 1 INJECTION, SOLUTION INTRAMUSCULAR; INTRAVENOUS; SUBCUTANEOUS at 04:46

## 2019-08-03 NOTE — PROGRESS NOTE
Assessment and Plan


Assessment and plan: 





Acute on chronic hypoxic respiratory failure


-On continuous BiPAP, unable to taper off


-Poor prognosis





Acute metabolic encephalopathy, not present on admission


-MS slightly improved today, will continue to monitor clinically





Persistent sinus tachycardia


-Continue Lopressor IV when necessary





Bilateral pleural effusion


-Likely malignant





SIRS, present on admission


-Probably due to noninfectious cause


-Chest x-ray on  suspicious for LLL pneumonia, however CTA chest on same day

negative for pneumonia





Elevated BNP on admission


-Probably due to volume overload


-No reported history of CHF





Stage IV breast cancer, with metastasis to the bone and spine


-Status post lumpectomy


-Cont supportive care. Pt was recently treated with palliative therapy at 

[facility] 





Chest pain/Dyspnea


-Probably due to pathological fracture of left 10th rib


-CTA chest negative for acute PE





Mild hyponatremia


-Improved





Abnormal LFT


-Probably secondary to metastasis





Anemia of chronic disease


-H&H stable





Paraplegia


-Continue supportive care





History of hypertension


-Blood pressure stable





Nutrition


-Patient refused NG tube placement. However, she can't be taken off BiPAP for 

oral diet because she's desaturates to the 60's.  Family providing her with sips

of drink.








CODE STATUS: full





Disposition: Poor prognosis, hospice recommended. However, despite extensive 

discussion with the family, they still want her to be full code and do not want 

hospice.  Patient does not qualify for LTAC due to 3 nights ICU stay rule which 

she does not have. CM looking for NHF that can accept BIPAP which is very chal

lenging.





History


Interval history: 





Patient is unable to communicate appropriately.  No significant change or new 

issues reported overnight





Hospitalist Physical





- Constitutional


Vitals: 


                                        











Temp Pulse Resp BP Pulse Ox


 


 98.2 F   134 H  20   147/106   94 


 


 19 07:47  19 10:00  19 10:00  19 07:47  19 10:00











General appearance: Present: severe distress, other (on BIPAP)





- EENT


Eyes: Present: PERRL, EOM intact


ENT: clear oral mucosa





- Neck


Neck: Present: supple





- Respiratory


Respiratory effort: labored


Respiratory: bilateral: diminished





- Cardiovascular


Rhythm: regular (with tachycardia)


Heart Sounds: Present: S1 & S2





- Extremities


Extremity abnormal: edema (in BUE and BLE)





- Abdominal


General gastrointestinal: soft, non-tender, normal bowel sounds





- Integumentary


Integumentary: Present: clear, warm





- Neurologic


Neurologic: other (patient opens eyes to external rub but unable to follow 

commands)





Results





- Labs


CBC & Chem 7: 


                                 19 10:08





                                 19 10:08


Labs: 


                             Laboratory Last Values











WBC  10.4 K/mm3 (4.5-11.0)   19  10:08    


 


RBC  2.53 M/mm3 (3.65-5.03)  L  19  10:08    


 


Hgb  8.0 gm/dl (10.1-14.3)  L  19  10:08    


 


Hct  24.2 % (30.3-42.9)  L  19  10:08    


 


MCV  96 fl (79-97)   19  10:08    


 


MCH  32 pg (28-32)   19  10:08    


 


MCHC  33 % (30-34)   19  10:08    


 


RDW  25.2 % (13.2-15.2)  H  19  10:08    


 


Plt Count  81 K/mm3 (140-440)  L  19  10:08    


 


Lymph % (Auto)  Np   19  04:43    


 


Mono % (Auto)  Np   19  04:43    


 


Eos % (Auto)  Np   19  04:43    


 


Baso % (Auto)  Np   19  04:43    


 


Lymph #  Np   19  04:43    


 


Mono #  Np   19  04:43    


 


Eos #  Np   19  04:43    


 


Baso #  Np   19  04:43    


 


Add Manual Diff  Complete   19  10:08    


 


Total Counted  100   19  10:08    


 


Seg Neutrophils %  Np   19  04:43    


 


Seg Neuts % (Manual)  91.0 % (40.0-70.0)  H  19  10:08    


 


  2.0 %  19  10:08    


 


  2.0 % (13.4-35.0)  L  19  10:08    


 


Reactive Lymphs % (Man)  0 %  19  10:08    


 


  1.0 % (0.0-7.3)   19  10:08    


 


  2.0 % (0.0-4.3)   19  10:08    


 


  0 % (0.0-1.8)   19  10:08    


 


  1.0 %  19  10:08    


 


  1.0 %  19  10:08    


 


  0 %  19  10:08    


 


  0 %  19  10:08    


 


Nucleated RBC %  32.0 % (0.0-0.9)  H  19  10:08    


 


Seg Neutrophils #  Np   19  04:43    


 


Seg Neutrophils # Man  0.0 K/mm3 (1.8-7.7)  L  19  10:08    


 


Band Neutrophils #  0.0 K/mm3  19  10:08    


 


  0.0 K/mm3 (1.2-5.4)  L  19  10:08    


 


Abs React Lymphs (Man)  0.0 K/mm3  19  10:08    


 


  0.0 K/mm3 (0.0-0.8)   19  10:08    


 


  0.0 K/mm3 (0.0-0.4)   19  10:08    


 


  0.0 K/mm3 (0.0-0.1)   19  10:08    


 


  0.0 K/mm3  19  10:08    


 


  0.0 K/mm3  19  10:08    


 


  0.0 K/mm3  19  10:08    


 


Blast Cells #  0.0 K/mm3  19  10:08    


 


WBC Morphology  Not Reportable   19  10:08    


 


Hypersegmented Neuts  Not Reportable   19  10:08    


 


Hyposegmented Neuts  Not Reportable   19  10:08    


 


Hypogranular Neuts  Not Reportable   19  10:08    


 


  Not Reportable   19  10:08    


 


  Not Reportable   19  10:08    


 


  Not Reportable   19  10:08    


 


  Not Reportable   19  10:08    


 


  Not Reportable   19  10:08    


 


  Not Reportable   19  10:08    


 


  Consistent w auto   19  10:08    


 


  Not Reportable   19  10:08    


 


Plt Clumps, EDTA  Not Reportable   19  10:08    


 


  Not Reportable   19  10:08    


 


  Not Reportable   19  10:08    


 


  Not Reportable   19  10:08    


 


Plt Morphology Comment  Not Reportable   19  10:08    


 


RBC Morphology  Not Reportable   19  10:08    


 


Dimorphic RBCs  Not Reportable   19  10:08    


 


  2+   19  10:08    


 


  Not Reportable   19  10:08    


 


  Not Reportable   19  10:08    


 


  2+   19  10:08    


 


  Not Reportable   19  10:08    


 


  1+   19  10:08    


 


  Not Reportable   19  10:08    


 


  Not Reportable   19  10:08    


 


  Not Reportable   19  10:08    


 


  2+   19  10:08    


 


  Not Reportable   19  10:08    


 


  Not Reportable   19  10:08    


 


  Not Reportable   19  10:08    


 


  Not Reportable   19  10:08    


 


  Not Reportable   19  10:08    


 


  Not Reportable   19  10:08    


 


  Not Reportable   19  10:08    


 


  Not Reportable   19  10:08    


 


  Not Reportable   19  10:08    


 


Acanthocytes (Spur)  Not Reportable   19  10:08    


 


Rouleaux  Not Reportable   19  10:08    


 


  Not Reportable   19  10:08    


 


  Not Reportable   19  10:08    


 


  Not Reportable   19  10:08    


 


  1+   19  10:08    


 


Hem Pathologist Commnt  No   19  10:08    


 


PT  15.1 Sec. (12.2-14.9)  H  19  18:00    


 


INR  1.22  (0.87-1.13)  H  19  18:00    


 


APTT  27.2 Sec. (24.2-36.6)   19  18:00    


 


  6342.18 ng/mlDDU (0-234)  H  19  18:00    


 


POC ABG pH  7.449  (7.35-7.45)   19  09:49    


 


POC ABG pCO2  39.9  (35-45)   19  09:49    


 


POC ABG pO2  72  ()  L  19  09:49    


 


POC ABG HCO3  27.6  (22-26 mml/L)   19  09:49    


 


POC ABG Total CO2  29  (23-27mmol/L)   19  09:49    


 


POC ABG O2 Sat  95   19  09:49    


 


POC ABG Base Excess  4  ((-2) - (+3)mmol/L)   19  09:49    


 


  35 %  19  09:49    


 


Sodium  139 mmol/L (137-145)   19  10:08    


 


Potassium  4.0 mmol/L (3.6-5.0)   19  10:08    


 


Chloride  109.3 mmol/L ()  H  19  10:08    


 


Carbon Dioxide  24 mmol/L (22-30)   19  10:08    


 


  10 mmol/L  19  10:08    


 


BUN  6 mg/dL (7-17)  L  19  10:08    


 


  0.2 mg/dL (0.7-1.2)  L  19  10:08    


 


Estimated GFR  > 60 ml/min  19  10:08    


 


  30 %  19  10:08    


 


Glucose  138 mg/dL ()  H  19  10:08    


 


POC Glucose  169  ()  H  19  12:11    


 


Calcium  8.8 mg/dL (8.4-10.2)   19  10:08    


 


Magnesium  2.30 mg/dL (1.7-2.3)   19  10:08    


 


  1.70 mg/dL (0.1-1.2)  H  19  05:22    


 


AST  72 units/L (5-40)  H  19  05:22    


 


ALT  25 units/L (7-56)   19  05:22    


 


  138 units/L ()  H  19  05:22    


 


  132 units/L ()   19  18:00    


 


CK-MB (CK-2)  3.8 ng/mL (0.0-4.0)   19  18:00    


 


CK-MB (CK-2) Rel Index  2.8  (0-4)   19  18:00    


 


  < 0.010 ng/mL (0.00-0.029)   19  18:00    


 


NT-Pro-B Natriuret Pep  1304 pg/mL (0-900)  H  19  18:00    


 


  4.9 g/dL (6.3-8.2)  L  19  05:22    


 


  2.2 g/dL (3.9-5)  L  19  05:22    


 


  0.8 %  19  05:22    














Active Medications





- Current Medications


Current Medications: 














Generic Name Dose Route Start Last Admin





  Trade Name Freq  PRN Reason Stop Dose Admin


 


Acetaminophen  650 mg  19 23:33 





  Tylenol  PO  





  Q4H PRN  





  Pain MILD(1-3)/Fever >100.5/HA  


 


Acetaminophen  650 mg  19 22:18  19 00:15





  Tylenol  [place]   650 mg





  Q4H PRN   Administration





  Pain, Mild (1-3)  


 


Acetaminophen/Hydrocodone Bitart  2 each  19 23:33 





  [place] 5/325  PO  





  Q6H PRN  





  Pain, Moderate (4-6)  


 


Hydromorphone HCl  0.5 mg  19 09:04  19 04:46





  Dilaudid  IV   0.5 mg





  Q3H PRN   Administration





  Pain , Severe (7-10)  


 


Dextrose/Sodium Chloride  1,000 mls @ 75 mls/hr  19 13:00  19 17:35





  D5ns  IV   75 mls/hr





  AS DIRECT RAMÓN   Administration


 


Lorazepam  1 mg  19 11:30  19 04:34





  Ativan  IV   1 mg





  Q6H PRN   Administration





  Anxiety  


 


Metoprolol Tartrate  2.5 mg  19 16:33  19 17:52





  Lopressor  IV   2.5 mg





  Q6HR PRN   Administration





  tachycardia  


 


Ondansetron HCl  4 mg  19 23:33 





  Zofran  IV  





  Q8H PRN  





  Nausea And Vomiting  


 


Sodium Chloride  10 ml  19 10:00  19 10:10





  Sodium Chloride Flush Syringe 10 Ml  IV   10 ml





  BID RAMÓN   Administration


 


Sodium Chloride  10 ml  19 23:33  19 04:35





  Sodium Chloride Flush Syringe 10 Ml  IV   10 ml





  PRN PRN   Administration





  LINE FLUSH  














Nutrition/Malnutrition Assess





- Dietary Evaluation


Nutrition/Malnutrition Findings: 


                                        





Nutrition Notes                                            Start:  19 

15:14


Freq:                                                      Status: Active       




Protocol:                                                                       




 Document     19 14:50  RM  (Rec: 19 15:03  RM  [id number])


 Nutrition Notes


     Initial or Follow up                        Reassessment


     Current Diagnosis                           Hypertension,Respiratory


                                                 Failure


     Other Pertinent Diagnosis                   Acute on chronic resp failure,


                                                 metastatic breast CA, Sacral


                                                 PU, paraplegia


     Current Diet                                NPO


     Labs/Tests                                  Reviewed


     Pertinent Medications                       Lasix


     Height                                      5 ft 6 in


     Weight                                      83.4 kg


     Ideal Body Weight (kg)                      59.09


     BMI                                         29.7


     Subjective/Other Information                NPO ordered d/t BiPAP. Per


                                                 progress note MD recommended


                                                 hospice to family but family


                                                 declined. Case management is


                                                 looking for NH placement.


     Burn                                        Absent


     Trauma                                      Absent


     #1


      Nutrition Diagnosis                        Malnutrition


      Diagnosis Progress(for reassessment        Continues


       documentation)                            


     Is patient on ventilator?                   No


     Is Patient Ambulatory and/or Out of Bed     No


     REE-([facility]-confined to bed)      [phone]


     Calculation Used for Recommendations        [facility]


     Additional Notes                            Pro needs 1-1.2g/k-87g/


                                                 day


                                                 Fluid needs 1ml/kcal


 Nutrition Intervention


     Change Diet Order:                          Diet advancement when


                                                 medically feasible


     Goal #1                                     Diet advancement


     Follow-Up By:                               19


     Additional Comments                         Follow for POC

## 2019-08-04 VITALS — DIASTOLIC BLOOD PRESSURE: 43 MMHG | SYSTOLIC BLOOD PRESSURE: 62 MMHG

## 2019-08-04 PROCEDURE — 5A09357 ASSISTANCE WITH RESPIRATORY VENTILATION, LESS THAN 24 CONSECUTIVE HOURS, CONTINUOUS POSITIVE AIRWAY PRESSURE: ICD-10-PCS | Performed by: INTERNAL MEDICINE

## 2019-08-04 PROCEDURE — 5A1935Z RESPIRATORY VENTILATION, LESS THAN 24 CONSECUTIVE HOURS: ICD-10-PCS | Performed by: INTERNAL MEDICINE

## 2019-08-04 PROCEDURE — 0BH17EZ INSERTION OF ENDOTRACHEAL AIRWAY INTO TRACHEA, VIA NATURAL OR ARTIFICIAL OPENING: ICD-10-PCS | Performed by: INTERNAL MEDICINE

## 2019-08-04 RX ADMIN — Medication SCH ML: at 16:47

## 2019-08-04 RX ADMIN — DEXTROSE AND SODIUM CHLORIDE SCH MLS/HR: 5; .9 INJECTION, SOLUTION INTRAVENOUS at 18:36

## 2019-08-04 NOTE — XRAY REPORT
CHEST 1 VIEW 8/4/2019 7:25 PM



INDICATION / CLINICAL INFORMATION:

ETT PLACEMENT.



COMPARISON: 

7/17/2019



FINDINGS:



SUPPORT DEVICES: ET tube has tip 4.5 cm above elisa



HEART / MEDIASTINUM: Cardiac silhouette remains moderately enlarged 



LUNGS / PLEURA: Moderate bilateral pulmonary opacities are present likely secondary to CHF. Small galdino
ateral pleural effusions, left greater than right No pneumothorax. 



ADDITIONAL FINDINGS: Marked gaseous distention of stomach without visualized NG tube



IMPRESSION:

1. Bilateral pleural-parenchymal disease. 

2. Marked gaseous distention of stomach. Recommend NG tube placement



Signer Name: Jay Vargas MD 

Signed: 8/4/2019 10:18 PM

 Workstation Name: [id number]

## 2019-08-04 NOTE — EVENT NOTE
Date: 19


Patient coded twice around 19 hours


ACLS protocol was initiated


After the first course patient was resuscitated and intubated and was 

transferred to ICU


She had pulse and blood pressure of around 89-year-old 89


She occurred again and is 1930 hrs.


Second time she could not make it


Patient was pronounced  at 1940 hrs.


Death certificate to be signed by Dr. Olvera


Time of expiration 1940 hrs.

## 2019-08-04 NOTE — PROGRESS NOTE
Assessment and Plan


Assessment and plan: 





59-year-old [place] female with medical history significant for stage IV 

breast cancer, metastasis to the bone and to the spinal cord with spinal 

compression is interested to the emergency department with complaints of 

shortness of breath.  CTA was done in the emergency department and no PE was 

shown.  Patient was recently discharged from [facility] to hospice care.  I 

get the record from [facility] and she was admitted with the same problems. 

patient is full code and family wants everything to be done and don't want 

hospice care instead they want HH at discharge





Acute on chronic hypoxic respiratory failure


- Patient was on home oxygen


- Continue BiPAP


- Pulmonary consult appreciated


- If patient deteriorates will intubate her, at this point patient may benefit 

from trache and vent and NH placement





Persistent tachycardia


- Continue on Lopressor IV when necessary





Stage IV breast cancer, with metastatsis to the bone and spine, paraplegia


- Supportive care


- Was treated with palliative intent therapy at [facility]





Chest pain/Dyspnea


- due to pathological fracture of left 10th rib


-CTA chest negative for acute PE





Mild hyponatremia


-Improved





Abnormal LFT


-Probably secondary to metastasis





Anemia of chronic disease


-H&H stable





Paraplegia


-Continue supportive care





History of hypertension


-Blood pressure stable





Nutrition


-Patient refused NG tube placement. However, she can't be taken off BiPAP for 

oral diet because she's desaturates to the 60's.  Family providing her with sips

of drink.








CODE STATUS full


Disposition; continue inpatient care. 





Poor prognosis, dw family, recommend that she return to hospice


-her daughter was adamant that the mom was confused on pain meds when she made 

the decision for hospice and that they do not want hospice, they want full code 

and continued aggressive mgt








CODE STATUS: full





Disposition: Poor prognosis, hospice recommended. However, despite extensive 

discussion with the family, they still want her to be full code and do not want 

hospice.  Patient does not qualify for LTAC due to 3 nights ICU stay rule which 

she does not have. CM looking for NHF that can accept BIPAP which is very 

challenging.











History


Interval history: 





Patient was seen and evaluated this morning, patient is in no respiratory 

distress.  She is on BiPAP.





Hospitalist Physical





- Physical exam


Narrative exam: 





 patient is in respiratory distress. 


 The patient appeared well nourished and normally developed.


 Vital signs as documented.


 Head exam is unremarkable.


 No scleral icterus .


 Neck is without jugular venous distension, thyromegaly, or carotid bruits. 


 Lungs are clear to auscultation.


Cardiac exam reveals tachycardia. 


Abdominal exam reveals normal bowel sounds, no masses, no organomegaly and no 

aortic enlargement. 


Extremities are nonedematous and both femoral and pedal pulses are normal.


CNS:paraplegic.





Hospitalist Physical





- Constitutional


Vitals: 


                                        











Temp Pulse Resp BP Pulse Ox


 


 98.5 F   124 H  47 H  97/55   92 


 


 19 07:44  19 08:00  19 08:00  19 07:44  19 08:00











General appearance: Present: severe distress, other (on BIPAP)





Results





- Labs


CBC & Chem 7: 


                                 19 10:08





                                 19 10:08


Labs: 


                             Laboratory Last Values











WBC  10.4 K/mm3 (4.5-11.0)   19  10:08    


 


RBC  2.53 M/mm3 (3.65-5.03)  L  19  10:08    


 


Hgb  8.0 gm/dl (10.1-14.3)  L  19  10:08    


 


Hct  24.2 % (30.3-42.9)  L  19  10:08    


 


MCV  96 fl (79-97)   19  10:08    


 


MCH  32 pg (28-32)   19  10:08    


 


MCHC  33 % (30-34)   19  10:08    


 


RDW  25.2 % (13.2-15.2)  H  19  10:08    


 


Plt Count  81 K/mm3 (140-440)  L  19  10:08    


 


Lymph % (Auto)  Np   19  04:43    


 


Mono % (Auto)  Np   19  04:43    


 


Eos % (Auto)  Np   19  04:43    


 


Baso % (Auto)  Np   19  04:43    


 


Lymph #  Np   19  04:43    


 


Mono #  Np   19  04:43    


 


Eos #  Np   19  04:43    


 


Baso #  Np   19  04:43    


 


Add Manual Diff  Complete   19  10:08    


 


Total Counted  100   19  10:08    


 


Seg Neutrophils %  Np   19  04:43    


 


Seg Neuts % (Manual)  91.0 % (40.0-70.0)  H  19  10:08    


 


  2.0 %  19  10:08    


 


  2.0 % (13.4-35.0)  L  19  10:08    


 


Reactive Lymphs % (Man)  0 %  19  10:08    


 


  1.0 % (0.0-7.3)   19  10:08    


 


  2.0 % (0.0-4.3)   19  10:08    


 


  0 % (0.0-1.8)   19  10:08    


 


  1.0 %  19  10:08    


 


  1.0 %  19  10:08    


 


  0 %  19  10:08    


 


  0 %  19  10:08    


 


Nucleated RBC %  32.0 % (0.0-0.9)  H  19  10:08    


 


Seg Neutrophils #  Np   19  04:43    


 


Seg Neutrophils # Man  0.0 K/mm3 (1.8-7.7)  L  19  10:08    


 


Band Neutrophils #  0.0 K/mm3  19  10:08    


 


  0.0 K/mm3 (1.2-5.4)  L  19  10:08    


 


Abs React Lymphs (Man)  0.0 K/mm3  19  10:08    


 


  0.0 K/mm3 (0.0-0.8)   19  10:08    


 


  0.0 K/mm3 (0.0-0.4)   19  10:08    


 


  0.0 K/mm3 (0.0-0.1)   19  10:08    


 


  0.0 K/mm3  19  10:08    


 


  0.0 K/mm3  19  10:08    


 


  0.0 K/mm3  19  10:08    


 


Blast Cells #  0.0 K/mm3  19  10:08    


 


WBC Morphology  Not Reportable   19  10:08    


 


Hypersegmented Neuts  Not Reportable   19  10:08    


 


Hyposegmented Neuts  Not Reportable   19  10:08    


 


Hypogranular Neuts  Not Reportable   19  10:08    


 


  Not Reportable   19  10:08    


 


  Not Reportable   19  10:08    


 


  Not Reportable   19  10:08    


 


  Not Reportable   19  10:08    


 


  Not Reportable   19  10:08    


 


  Not Reportable   19  10:08    


 


  Consistent w auto   19  10:08    


 


  Not Reportable   19  10:08    


 


Plt Clumps, EDTA  Not Reportable   19  10:08    


 


  Not Reportable   19  10:08    


 


  Not Reportable   19  10:08    


 


  Not Reportable   19  10:08    


 


Plt Morphology Comment  Not Reportable   19  10:08    


 


RBC Morphology  Not Reportable   19  10:08    


 


Dimorphic RBCs  Not Reportable   19  10:08    


 


  2+   19  10:08    


 


  Not Reportable   19  10:08    


 


  Not Reportable   19  10:08    


 


  2+   19  10:08    


 


  Not Reportable   19  10:08    


 


  1+   19  10:08    


 


  Not Reportable   19  10:08    


 


  Not Reportable   19  10:08    


 


  Not Reportable   19  10:08    


 


  2+   19  10:08    


 


  Not Reportable   19  10:08    


 


  Not Reportable   19  10:08    


 


  Not Reportable   19  10:08    


 


  Not Reportable   19  10:08    


 


  Not Reportable   19  10:08    


 


  Not Reportable   19  10:08    


 


  Not Reportable   19  10:08    


 


  Not Reportable   19  10:08    


 


  Not Reportable   19  10:08    


 


Acanthocytes (Spur)  Not Reportable   19  10:08    


 


Rouleaux  Not Reportable   19  10:08    


 


  Not Reportable   19  10:08    


 


  Not Reportable   19  10:08    


 


  Not Reportable   19  10:08    


 


  1+   19  10:08    


 


Hem Pathologist Commnt  No   19  10:08    


 


PT  15.1 Sec. (12.2-14.9)  H  19  18:00    


 


INR  1.22  (0.87-1.13)  H  19  18:00    


 


APTT  27.2 Sec. (24.2-36.6)   19  18:00    


 


  6342.18 ng/mlDDU (0-234)  H  19  18:00    


 


POC ABG pH  7.449  (7.35-7.45)   19  09:49    


 


POC ABG pCO2  39.9  (35-45)   19  09:49    


 


POC ABG pO2  72  ()  L  19  09:49    


 


POC ABG HCO3  27.6  (22-26 mml/L)   19  09:49    


 


POC ABG Total CO2  29  (23-27mmol/L)   19  09:49    


 


POC ABG O2 Sat  95   19  09:49    


 


POC ABG Base Excess  4  ((-2) - (+3)mmol/L)   19  09:49    


 


  35 %  19  09:49    


 


Sodium  139 mmol/L (137-145)   19  10:08    


 


Potassium  4.0 mmol/L (3.6-5.0)   19  10:08    


 


Chloride  109.3 mmol/L ()  H  19  10:08    


 


Carbon Dioxide  24 mmol/L (22-30)   19  10:08    


 


  10 mmol/L  19  10:08    


 


BUN  6 mg/dL (7-17)  L  19  10:08    


 


  0.2 mg/dL (0.7-1.2)  L  19  10:08    


 


Estimated GFR  > 60 ml/min  19  10:08    


 


  30 %  19  10:08    


 


Glucose  138 mg/dL ()  H  19  10:08    


 


POC Glucose  152  ()  H  19  06:18    


 


Calcium  8.8 mg/dL (8.4-10.2)   19  10:08    


 


Magnesium  2.30 mg/dL (1.7-2.3)   19  10:08    


 


  1.70 mg/dL (0.1-1.2)  H  19  05:22    


 


AST  72 units/L (5-40)  H  19  05:22    


 


ALT  25 units/L (7-56)   19  05:22    


 


  138 units/L ()  H  19  05:22    


 


  132 units/L ()   19  18:00    


 


CK-MB (CK-2)  3.8 ng/mL (0.0-4.0)   19  18:00    


 


CK-MB (CK-2) Rel Index  2.8  (0-4)   19  18:00    


 


  < 0.010 ng/mL (0.00-0.029)   19  18:00    


 


NT-Pro-B Natriuret Pep  1304 pg/mL (0-900)  H  19  18:00    


 


  4.9 g/dL (6.3-8.2)  L  19  05:22    


 


  2.2 g/dL (3.9-5)  L  19  05:22    


 


  0.8 %  19  05:22    














Active Medications





- Current Medications


Current Medications: 














Generic Name Dose Route Start Last Admin





  Trade Name Freq  PRN Reason Stop Dose Admin


 


Acetaminophen  650 mg  19 23:33 





  Tylenol  PO  





  Q4H PRN  





  Pain MILD(1-3)/Fever >100.5/HA  


 


Acetaminophen  650 mg  19 22:18  19 00:15





  Tylenol  IL   650 mg





  Q4H PRN   Administration





  Pain, Mild (1-3)  


 


Acetaminophen/Hydrocodone Bitart  2 each  19 23:33 





  Armour 5/325  PO  





  Q6H PRN  





  Pain, Moderate (4-6)  


 


Hydromorphone HCl  0.5 mg  19 09:04  19 04:46





  Dilaudid  IV   0.5 mg





  Q3H PRN   Administration





  Pain , Severe (7-10)  


 


Dextrose/Sodium Chloride  1,000 mls @ 75 mls/hr  19 13:00  19 21:06





  D5ns  IV   75 mls/hr





  AS DIRECT RAMÓN   Administration


 


Lorazepam  1 mg  19 11:30  19 04:34





  Ativan  IV   1 mg





  Q6H PRN   Administration





  Anxiety  


 


Metoprolol Tartrate  2.5 mg  19 16:33  19 17:52





  Lopressor  IV   2.5 mg





  Q6HR PRN   Administration





  tachycardia  


 


Ondansetron HCl  4 mg  19 23:33 





  Zofran  IV  





  Q8H PRN  





  Nausea And Vomiting  


 


Sodium Chloride  10 ml  19 10:00  19 21:06





  Sodium Chloride Flush Syringe 10 Ml  IV   10 ml





  BID RAMÓN   Administration


 


Sodium Chloride  10 ml  19 23:33  19 04:35





  Sodium Chloride Flush Syringe 10 Ml  IV   10 ml





  PRN PRN   Administration





  LINE FLUSH  














Nutrition/Malnutrition Assess





- Dietary Evaluation


Nutrition/Malnutrition Findings: 


                                        





Nutrition Notes                                            Start:  19 15:

14


Freq:                                                      Status: Active       




Protocol:                                                                       




 Document     19 14:50  RM  (Rec: 19 15:03  RM  [id number])


 Nutrition Notes


     Initial or Follow up                        Reassessment


     Current Diagnosis                           Hypertension,Respiratory


                                                 Failure


     Other Pertinent Diagnosis                   Acute on chronic resp failure,


                                                 metastatic breast CA, Sacral


                                                 PU, paraplegia


     Current Diet                                NPO


     Labs/Tests                                  Reviewed


     Pertinent Medications                       Lasix


     Height                                      5 ft 6 in


     Weight                                      83.4 kg


     Ideal Body Weight (kg)                      59.09


     BMI                                         29.7


     Subjective/Other Information                NPO ordered d/t BiPAP. Per


                                                 progress note MD recommended


                                                 hospice to family but family


                                                 declined. Case management is


                                                 looking for NH placement.


     Burn                                        Absent


     Trauma                                      Absent


     #1


      Nutrition Diagnosis                        Malnutrition


      Diagnosis Progress(for reassessment        Continues


       documentation)                            


     Is patient on ventilator?                   No


     Is Patient Ambulatory and/or Out of Bed     No


     [facility]-([facility]-confined to bed)      [phone]


     Calculation Used for Recommendations        [facility]


     Additional Notes                            Pro needs 1-1.2g/k-87g/


                                                 day


                                                 Fluid needs 1ml/kcal


 Nutrition Intervention


     Change Diet Order:                          Diet advancement when


                                                 medically feasible


     Goal #1                                     Diet advancement


     Follow-Up By:                               19


     Additional Comments                         Follow for POC

## 2019-08-05 NOTE — DEATH SUMMARY
Death Summary





- Providers


Consults: 


                                        





07/18/19 07:18


Consult to Wound/ET Nurse [CONS] Routine 


   Reason For Exam: wound eval - pressure ulcer at sacral area





08/04/19 09:37


Consult to Physician [CONS] Routine 


   Comment: 


   Consulting Provider: MAJO BROWN


   Physician Instructions: 


   Reason For Exam: respiratory failure





08/04/19 10:39


Consult to Ethics Committee [CONS] Routine 


   Reason For Exam: end of life discussion with family











Attending: 


GEORGINA RUFFIN MD








- Death summary


Date of admission: 


07/17/19 22:15





Date of Death: 08/04/19
